# Patient Record
Sex: FEMALE | Race: OTHER | Employment: FULL TIME | URBAN - METROPOLITAN AREA
[De-identification: names, ages, dates, MRNs, and addresses within clinical notes are randomized per-mention and may not be internally consistent; named-entity substitution may affect disease eponyms.]

---

## 2017-08-31 ENCOUNTER — APPOINTMENT (OUTPATIENT)
Dept: OTHER | Facility: HOSPITAL | Age: 22
End: 2017-08-31
Attending: EMERGENCY MEDICINE

## 2018-02-07 ENCOUNTER — APPOINTMENT (OUTPATIENT)
Dept: OTHER | Facility: HOSPITAL | Age: 23
End: 2018-02-07
Attending: FAMILY MEDICINE

## 2018-02-14 ENCOUNTER — APPOINTMENT (OUTPATIENT)
Dept: OTHER | Facility: HOSPITAL | Age: 23
End: 2018-02-14
Attending: FAMILY MEDICINE

## 2018-02-16 ENCOUNTER — APPOINTMENT (OUTPATIENT)
Dept: OTHER | Facility: HOSPITAL | Age: 23
End: 2018-02-16
Attending: PREVENTIVE MEDICINE

## 2018-12-11 ENCOUNTER — APPOINTMENT (OUTPATIENT)
Dept: GENERAL RADIOLOGY | Facility: HOSPITAL | Age: 23
End: 2018-12-11
Attending: NURSE PRACTITIONER
Payer: COMMERCIAL

## 2018-12-11 ENCOUNTER — HOSPITAL ENCOUNTER (EMERGENCY)
Facility: HOSPITAL | Age: 23
Discharge: HOME | End: 2018-12-11
Payer: COMMERCIAL

## 2018-12-11 VITALS
OXYGEN SATURATION: 98 % | HEIGHT: 67 IN | RESPIRATION RATE: 18 BRPM | BODY MASS INDEX: 40.81 KG/M2 | TEMPERATURE: 99 F | DIASTOLIC BLOOD PRESSURE: 67 MMHG | SYSTOLIC BLOOD PRESSURE: 112 MMHG | WEIGHT: 260 LBS | HEART RATE: 87 BPM

## 2018-12-11 DIAGNOSIS — J06.9 VIRAL UPPER RESPIRATORY TRACT INFECTION: Primary | ICD-10-CM

## 2018-12-11 PROCEDURE — 87086 URINE CULTURE/COLONY COUNT: CPT | Performed by: NURSE PRACTITIONER

## 2018-12-11 PROCEDURE — 81001 URINALYSIS AUTO W/SCOPE: CPT | Performed by: NURSE PRACTITIONER

## 2018-12-11 PROCEDURE — 87430 STREP A AG IA: CPT

## 2018-12-11 PROCEDURE — 80048 BASIC METABOLIC PNL TOTAL CA: CPT | Performed by: NURSE PRACTITIONER

## 2018-12-11 PROCEDURE — 85025 COMPLETE CBC W/AUTO DIFF WBC: CPT | Performed by: NURSE PRACTITIONER

## 2018-12-11 PROCEDURE — 86308 HETEROPHILE ANTIBODY SCREEN: CPT | Performed by: NURSE PRACTITIONER

## 2018-12-11 PROCEDURE — 99284 EMERGENCY DEPT VISIT MOD MDM: CPT

## 2018-12-11 PROCEDURE — 36415 COLL VENOUS BLD VENIPUNCTURE: CPT

## 2018-12-11 PROCEDURE — 71046 X-RAY EXAM CHEST 2 VIEWS: CPT | Performed by: NURSE PRACTITIONER

## 2018-12-11 PROCEDURE — 81025 URINE PREGNANCY TEST: CPT

## 2018-12-11 NOTE — ED PROVIDER NOTES
Patient Seen in: [facility] AND [facility] Emergency Department    History   Patient presents with:  Cough/URI    Stated Complaint: dx with flu x 1 week/ not getting any better    HPI    Patient complains of cough and ear ache and sore throat that is not getting Date)   SpO2 98%   BMI 40.72 kg/m²      GENERAL: Well-appearing, no distress noted patient is speaking in full sentences swallowing her own secretions  HEAD: normocephalic, atraumatic,   EYES: PERRLA, EOMI, conj sclera clear  THROAT: mmm, no lesions, mild MDM           Radiology findings: Xr Chest Pa + Lat Chest (cpt=71046)    Result Date: 12/11/2018  CONCLUSION: Normal examination.       Dictated by (CST): Ani Staton MD on 12/11/2018 at 17:42     Approved by (CST): Ani Staton MD on 12/11/2

## 2018-12-11 NOTE — ED INITIAL ASSESSMENT (HPI)
Patient was seen at [facility] last week and told she had the flu. States she still has a cough and is feeling no better. Patient was given a z pack at [facility].

## 2019-01-29 ENCOUNTER — TELEPHONE (OUTPATIENT)
Dept: OBGYN CLINIC | Facility: CLINIC | Age: 24
End: 2019-01-29

## 2019-07-01 ENCOUNTER — OFFICE VISIT (OUTPATIENT)
Dept: FAMILY MEDICINE CLINIC | Facility: CLINIC | Age: 24
End: 2019-07-01
Payer: COMMERCIAL

## 2019-07-01 VITALS
RESPIRATION RATE: 16 BRPM | WEIGHT: 260 LBS | HEART RATE: 73 BPM | DIASTOLIC BLOOD PRESSURE: 61 MMHG | HEIGHT: 67 IN | BODY MASS INDEX: 40.81 KG/M2 | SYSTOLIC BLOOD PRESSURE: 104 MMHG | OXYGEN SATURATION: 100 % | TEMPERATURE: 98 F

## 2019-07-01 DIAGNOSIS — J00 ACUTE NASOPHARYNGITIS: Primary | ICD-10-CM

## 2019-07-01 DIAGNOSIS — J02.9 PHARYNGITIS, UNSPECIFIED ETIOLOGY: ICD-10-CM

## 2019-07-01 LAB
CONTROL LINE PRESENT WITH A CLEAR BACKGROUND (YES/NO): YES YES/NO
STREP GRP A CUL-SCR: NEGATIVE

## 2019-07-01 PROCEDURE — 99202 OFFICE O/P NEW SF 15 MIN: CPT | Performed by: NURSE PRACTITIONER

## 2019-07-01 PROCEDURE — 87880 STREP A ASSAY W/OPTIC: CPT | Performed by: NURSE PRACTITIONER

## 2019-07-01 NOTE — PATIENT INSTRUCTIONS
Adult Self-Care for Colds    Colds are caused by viruses. They can't be cured with antibiotics. However, you can ease symptoms and support your body's efforts to heal itself.  No matter which symptoms you have, be sure to:  · Drink plenty of fluids (water When you first notice symptoms, ask your healthcare provider if antiviral medicines are appropriate. Antibiotics should not be taken for colds or flu.  Also, call your healthcare provider if you have any of the following symptoms or if you aren't feeling be Over-the-counter medicine can reduce sore throat symptoms. Ask your pharmacist if you have questions about which medicine to use:  · Ease pain with anesthetic sprays. Aspirin or an aspirin substitute also helps.  Remember, never give aspirin to anyone 18 or

## 2019-07-01 NOTE — PROGRESS NOTES
CHIEF COMPLAINT:   Patient presents with:  Sore Throat: fever of 102F, coughing, congestion, rhinorreha. phelgm, X 1 day      HPI:   Spring Madeline is a 30year old female who presents for upper respiratory symptoms for  1 days.  Patient reports sor THROAT: Oral mucosa pink, moist. Posterior pharynx is mildly injected. no exudates. Tonsils 0/4. Uvula midline   NECK: Supple, non-tender  LUNGS: clear to auscultation bilaterally, no wheezes or rhonchi. Breathing is non labored.   CARDIO: RRR without murmu May consider OTC Cepacol as needed and directed on packaging for sore throat. RTC if symptoms worsening, persisting, or new symptoms develop. Patient Instructions       Adult Self-Care for Colds    Colds are caused by viruses.  They can't be cured w · As your appetite returns, you can resume your normal diet. Ask your healthcare provider if there are any foods you should avoid.   When to seek medical care  When you first notice symptoms, ask your healthcare provider if antiviral medicines are appropria Gargling every hour or 2 can ease irritation.  Try gargling with 1 of these solutions:  · 1/4 teaspoon of salt in 1/2 cup of warm water  · An over-the-counter anesthetic gargle  Use medicine for more relief  Over-the-counter medicine can reduce sore throat

## 2019-07-12 ENCOUNTER — APPOINTMENT (OUTPATIENT)
Dept: GENERAL RADIOLOGY | Facility: HOSPITAL | Age: 24
End: 2019-07-12
Payer: COMMERCIAL

## 2019-07-12 ENCOUNTER — HOSPITAL ENCOUNTER (EMERGENCY)
Facility: HOSPITAL | Age: 24
Discharge: HOME | End: 2019-07-12
Attending: EMERGENCY MEDICINE
Payer: COMMERCIAL

## 2019-07-12 VITALS
WEIGHT: 250 LBS | HEIGHT: 68 IN | RESPIRATION RATE: 18 BRPM | OXYGEN SATURATION: 98 % | HEART RATE: 83 BPM | TEMPERATURE: 98 F | DIASTOLIC BLOOD PRESSURE: 75 MMHG | BODY MASS INDEX: 37.89 KG/M2 | SYSTOLIC BLOOD PRESSURE: 120 MMHG

## 2019-07-12 DIAGNOSIS — J40 BRONCHITIS: Primary | ICD-10-CM

## 2019-07-12 PROCEDURE — 99283 EMERGENCY DEPT VISIT LOW MDM: CPT

## 2019-07-12 PROCEDURE — 71045 X-RAY EXAM CHEST 1 VIEW: CPT | Performed by: EMERGENCY MEDICINE

## 2019-07-12 RX ORDER — BENZONATATE 100 MG/1
100 CAPSULE ORAL 3 TIMES DAILY PRN
Qty: 30 CAPSULE | Refills: 0 | Status: SHIPPED | OUTPATIENT
Start: 2019-07-12 | End: 2019-08-11

## 2019-07-12 RX ORDER — ALBUTEROL SULFATE 90 UG/1
2 AEROSOL, METERED RESPIRATORY (INHALATION) EVERY 4 HOURS PRN
Qty: 1 INHALER | Refills: 0 | Status: SHIPPED | OUTPATIENT
Start: 2019-07-12 | End: 2019-08-11

## 2019-07-13 NOTE — ED PROVIDER NOTES
Patient Seen in: [facility] AND [facility] Emergency Department    History   Patient presents with:  Cough/URI    Stated Complaint:     HPI    31 yo female with cough for two weeks. Fever as recent as yesterday as high as 101. No relief with OTC cough meds.  Coug Discharge home on albuterol and tessalon.            Disposition and Plan     Clinical Impression:  Bronchitis  (primary encounter diagnosis)    Disposition:  Discharge  7/12/2019 11:06 pm    Follow-up:  José Miguel Rodriguez MD  [id number]

## 2019-07-14 ENCOUNTER — HOSPITAL ENCOUNTER (EMERGENCY)
Facility: HOSPITAL | Age: 24
Discharge: HOME | End: 2019-07-14
Payer: COMMERCIAL

## 2019-07-14 ENCOUNTER — APPOINTMENT (OUTPATIENT)
Dept: GENERAL RADIOLOGY | Facility: HOSPITAL | Age: 24
End: 2019-07-14
Payer: COMMERCIAL

## 2019-07-14 VITALS
SYSTOLIC BLOOD PRESSURE: 111 MMHG | HEART RATE: 80 BPM | DIASTOLIC BLOOD PRESSURE: 57 MMHG | OXYGEN SATURATION: 99 % | TEMPERATURE: 99 F | RESPIRATION RATE: 18 BRPM

## 2019-07-14 DIAGNOSIS — H65.02 NON-RECURRENT ACUTE SEROUS OTITIS MEDIA OF LEFT EAR: ICD-10-CM

## 2019-07-14 DIAGNOSIS — J01.10 ACUTE NON-RECURRENT FRONTAL SINUSITIS: Primary | ICD-10-CM

## 2019-07-14 PROCEDURE — 71046 X-RAY EXAM CHEST 2 VIEWS: CPT

## 2019-07-14 PROCEDURE — 99283 EMERGENCY DEPT VISIT LOW MDM: CPT

## 2019-07-14 RX ORDER — AMOXICILLIN AND CLAVULANATE POTASSIUM 875; 125 MG/1; MG/1
1 TABLET, FILM COATED ORAL 2 TIMES DAILY
Qty: 20 TABLET | Refills: 0 | Status: SHIPPED | OUTPATIENT
Start: 2019-07-14 | End: 2019-07-24

## 2019-07-14 RX ORDER — FLUTICASONE PROPIONATE 50 MCG
2 SPRAY, SUSPENSION (ML) NASAL DAILY
Qty: 16 G | Refills: 0 | Status: SHIPPED | OUTPATIENT
Start: 2019-07-14 | End: 2019-08-13

## 2019-07-14 RX ORDER — PSEUDOEPHEDRINE HCL 120 MG/1
120 TABLET, FILM COATED, EXTENDED RELEASE ORAL EVERY 12 HOURS PRN
Qty: 10 TABLET | Refills: 0 | Status: SHIPPED | OUTPATIENT
Start: 2019-07-14 | End: 2019-08-13

## 2019-07-14 NOTE — ED INITIAL ASSESSMENT (HPI)
Here Friday for bronchitis which pt reports is \"getting worser\" and sneezed with a big blood clot out of nose. No active bleeding from the nose.

## 2019-07-15 NOTE — ED PROVIDER NOTES
Patient Seen in: [facility] AND [facility] Emergency Department    History   Patient presents with:  Cough/URI    Stated Complaint: bloody nose    HPI    24-year-old female presents for complaint of cough congestion earache for the past 4 days.   Patient states t (37.1 °C)   Temp src Oral   SpO2 99 %   O2 Device None (Room air)       Current:/77   Pulse 91   Temp 98.7 °F (37.1 °C) (Oral)   Resp 18   LMP 07/05/2019   SpO2 99%         Physical Exam   Constitutional: She is oriented to person, place, and time.  Joanna Quarles worsening for 3 days. History of bronchitis. TECHNIQUE:   Two views. FINDINGS: CARDIAC/VASC: Normal.  No cardiac silhouette abnormality or cardiomegaly. Unremarkable pulmonary vasculature. MEDIAST/ENEDELIA: No visible mass or adenopathy.  LUNGS/PLEURA: Nor

## 2019-07-16 ENCOUNTER — OFFICE VISIT (OUTPATIENT)
Dept: FAMILY MEDICINE CLINIC | Facility: CLINIC | Age: 24
End: 2019-07-16
Payer: COMMERCIAL

## 2019-07-16 VITALS
HEART RATE: 76 BPM | SYSTOLIC BLOOD PRESSURE: 110 MMHG | HEIGHT: 67 IN | OXYGEN SATURATION: 98 % | DIASTOLIC BLOOD PRESSURE: 74 MMHG | WEIGHT: 280 LBS | BODY MASS INDEX: 43.95 KG/M2

## 2019-07-16 DIAGNOSIS — J00 ACUTE NASOPHARYNGITIS: ICD-10-CM

## 2019-07-16 DIAGNOSIS — J40 BRONCHITIS: Primary | ICD-10-CM

## 2019-07-16 DIAGNOSIS — J01.00 ACUTE NON-RECURRENT MAXILLARY SINUSITIS: ICD-10-CM

## 2019-07-16 PROCEDURE — 99203 OFFICE O/P NEW LOW 30 MIN: CPT | Performed by: FAMILY MEDICINE

## 2019-07-16 RX ORDER — PROMETHAZINE HYDROCHLORIDE AND CODEINE PHOSPHATE 6.25; 1 MG/5ML; MG/5ML
5 SOLUTION ORAL EVERY 6 HOURS PRN
Qty: 118 ML | Refills: 0 | Status: SHIPPED | OUTPATIENT
Start: 2019-07-16 | End: 2019-07-30

## 2019-07-16 NOTE — PROGRESS NOTES
HPI:   Patient presents with:  ER F/U      Micah Wilson is a 30year old female presenting for:      Went to ER 2d ago and dx w/ bronchitis and sinusitis.   Having cough, URI sx for past 2wks  Started on Abx (currently on day 3 of Abx, albuterol daily as needed for cough. Disp: 30 capsule Rfl: 0      No past medical history on file.       Past Surgical History:   Procedure Laterality Date   • TONSILLECTOMY       No Known Allergies   Social History:  Social History    Tobacco Use      Smoking status equal, round, and reactive to light. Conjunctivae are normal.   Cardiovascular: Normal rate, regular rhythm and normal heart sounds. No murmur heard. Pulmonary/Chest: Effort normal and breath sounds normal. No respiratory distress. Abdominal: Soft.  Yassine Arts

## 2019-12-27 ENCOUNTER — TELEPHONE (OUTPATIENT)
Dept: FAMILY MEDICINE CLINIC | Facility: CLINIC | Age: 24
End: 2019-12-27

## 2019-12-27 NOTE — TELEPHONE ENCOUNTER
Returned patient's call.  verified. Symptoms include cough, congestion and fever x 2 days. Notes everyone in her family has been sick. Patient reports taking Chiquita [place] Plus Cold & Flu, Robitussin DM and Tylenol for fever.   OTC meds are providing maite

## 2019-12-27 NOTE — TELEPHONE ENCOUNTER
Pt called stating that she has a bad bad cold since two days ago and not feeling good. She's been taking over the counter medication and its not helping.  She wanted to sched an appt for today but I informed her that  was completely full so she sched for

## 2019-12-30 ENCOUNTER — OFFICE VISIT (OUTPATIENT)
Dept: FAMILY MEDICINE CLINIC | Facility: CLINIC | Age: 24
End: 2019-12-30
Payer: COMMERCIAL

## 2019-12-30 VITALS
SYSTOLIC BLOOD PRESSURE: 122 MMHG | HEIGHT: 67 IN | DIASTOLIC BLOOD PRESSURE: 80 MMHG | TEMPERATURE: 98 F | WEIGHT: 288 LBS | BODY MASS INDEX: 45.2 KG/M2 | HEART RATE: 91 BPM | OXYGEN SATURATION: 96 %

## 2019-12-30 DIAGNOSIS — J02.9 SORE THROAT: ICD-10-CM

## 2019-12-30 DIAGNOSIS — J01.00 ACUTE NON-RECURRENT MAXILLARY SINUSITIS: Primary | ICD-10-CM

## 2019-12-30 DIAGNOSIS — J00 ACUTE NASOPHARYNGITIS: ICD-10-CM

## 2019-12-30 PROCEDURE — 87880 STREP A ASSAY W/OPTIC: CPT | Performed by: FAMILY MEDICINE

## 2019-12-30 PROCEDURE — 99213 OFFICE O/P EST LOW 20 MIN: CPT | Performed by: FAMILY MEDICINE

## 2019-12-30 RX ORDER — LORATADINE AND PSEUDOEPHEDRINE 10; 240 MG/1; MG/1
1 TABLET, EXTENDED RELEASE ORAL DAILY
Qty: 7 TABLET | Refills: 0 | Status: CANCELLED | OUTPATIENT
Start: 2019-12-30

## 2019-12-30 RX ORDER — FLUTICASONE PROPIONATE 50 MCG
2 SPRAY, SUSPENSION (ML) NASAL DAILY
Qty: 1 BOTTLE | Refills: 0 | Status: SHIPPED | OUTPATIENT
Start: 2019-12-30 | End: 2020-02-26

## 2019-12-30 RX ORDER — LORATADINE AND PSEUDOEPHEDRINE 10; 240 MG/1; MG/1
1 TABLET, EXTENDED RELEASE ORAL DAILY
Qty: 7 TABLET | Refills: 0 | Status: SHIPPED | OUTPATIENT
Start: 2019-12-30 | End: 2020-02-26

## 2019-12-30 RX ORDER — AZITHROMYCIN 250 MG/1
TABLET, FILM COATED ORAL
Qty: 6 TABLET | Refills: 0 | Status: SHIPPED | OUTPATIENT
Start: 2019-12-30 | End: 2020-02-26

## 2019-12-30 NOTE — PROGRESS NOTES
HPI:   Patient presents with:  URI: cough, sore throat.  congestion, bilateral ear pain and headache since Thursday      Nick Langhorne is a 30year old female presenting for:  URI/Cold   -Symptoms for 5d gradually worsening  -Patient reports coug Relation Age of Onset   • No Known Problems Father    • No Known Problems Mother    • Other (Muscular dystophy) Sister    • Diabetes Maternal Grandmother    • Hypertension Maternal Grandmother    • Other (brain cancer) Paternal Grandmother 76   • Other (ga Abdominal: Soft. Bowel sounds are normal. She exhibits no distension. There is no tenderness. Neurological: No cranial nerve deficit. Skin: No rash noted.            ASSESSMENT AND PLAN:   Patient is a 30year old female who presents primarily present Sig: Take 1 tablet by mouth daily.        Orders Placed This Encounter      POC Rapid Strep [id number]      Imaging & Consults:  None    Health Maintenance:  Annual Physical due on 03/18/1996  Annual Depression Screen due on 03/18/2005  HPV Vaccines(1 - Fe

## 2020-02-12 ENCOUNTER — HOSPITAL ENCOUNTER (EMERGENCY)
Facility: HOSPITAL | Age: 25
Discharge: HOME | End: 2020-02-12
Payer: COMMERCIAL

## 2020-02-12 ENCOUNTER — APPOINTMENT (OUTPATIENT)
Dept: ULTRASOUND IMAGING | Facility: HOSPITAL | Age: 25
End: 2020-02-12
Attending: NURSE PRACTITIONER
Payer: COMMERCIAL

## 2020-02-12 VITALS
SYSTOLIC BLOOD PRESSURE: 128 MMHG | BODY MASS INDEX: 38.51 KG/M2 | RESPIRATION RATE: 18 BRPM | HEART RATE: 74 BPM | OXYGEN SATURATION: 99 % | DIASTOLIC BLOOD PRESSURE: 77 MMHG | TEMPERATURE: 98 F | WEIGHT: 260 LBS | HEIGHT: 69 IN

## 2020-02-12 DIAGNOSIS — R10.13 EPIGASTRIC PAIN: Primary | ICD-10-CM

## 2020-02-12 LAB
ALBUMIN SERPL-MCNC: 3.8 G/DL (ref 3.4–5)
ALP LIVER SERPL-CCNC: 95 U/L (ref 37–98)
ALT SERPL-CCNC: 19 U/L (ref 13–56)
ANION GAP SERPL CALC-SCNC: 5 MMOL/L (ref 0–18)
AST SERPL-CCNC: 9 U/L (ref 15–37)
BASOPHILS # BLD AUTO: 0.02 X10(3) UL (ref 0–0.2)
BASOPHILS NFR BLD AUTO: 0.2 %
BILIRUB DIRECT SERPL-MCNC: <0.1 MG/DL (ref 0–0.2)
BILIRUB SERPL-MCNC: 0.2 MG/DL (ref 0.1–2)
BILIRUB UR QL: NEGATIVE
BUN BLD-MCNC: 10 MG/DL (ref 7–18)
BUN/CREAT SERPL: 19.2 (ref 10–20)
CALCIUM BLD-MCNC: 9.3 MG/DL (ref 8.5–10.1)
CHLORIDE SERPL-SCNC: 108 MMOL/L (ref 98–112)
CO2 SERPL-SCNC: 26 MMOL/L (ref 21–32)
CREAT BLD-MCNC: 0.52 MG/DL (ref 0.55–1.02)
DEPRECATED RDW RBC AUTO: 40.7 FL (ref 35.1–46.3)
EOSINOPHIL # BLD AUTO: 1.33 X10(3) UL (ref 0–0.7)
EOSINOPHIL NFR BLD AUTO: 15.6 %
ERYTHROCYTE [DISTWIDTH] IN BLOOD BY AUTOMATED COUNT: 13.8 % (ref 11–15)
GLUCOSE BLD-MCNC: 83 MG/DL (ref 70–99)
GLUCOSE UR-MCNC: NEGATIVE MG/DL
HCT VFR BLD AUTO: 36.9 % (ref 35–48)
HGB BLD-MCNC: 11.8 G/DL (ref 12–16)
IMM GRANULOCYTES # BLD AUTO: 0.01 X10(3) UL (ref 0–1)
IMM GRANULOCYTES NFR BLD: 0.1 %
KETONES UR-MCNC: NEGATIVE MG/DL
LIPASE SERPL-CCNC: 135 U/L (ref 73–393)
LYMPHOCYTES # BLD AUTO: 2.44 X10(3) UL (ref 1–4)
LYMPHOCYTES NFR BLD AUTO: 28.6 %
M PROTEIN MFR SERPL ELPH: 8 G/DL (ref 6.4–8.2)
MCH RBC QN AUTO: 26.2 PG (ref 26–34)
MCHC RBC AUTO-ENTMCNC: 32 G/DL (ref 31–37)
MCV RBC AUTO: 82 FL (ref 80–100)
MONOCYTES # BLD AUTO: 0.41 X10(3) UL (ref 0.1–1)
MONOCYTES NFR BLD AUTO: 4.8 %
NEUTROPHILS # BLD AUTO: 4.32 X10 (3) UL (ref 1.5–7.7)
NEUTROPHILS # BLD AUTO: 4.32 X10(3) UL (ref 1.5–7.7)
NEUTROPHILS NFR BLD AUTO: 50.7 %
NITRITE UR QL STRIP.AUTO: NEGATIVE
OSMOLALITY SERPL CALC.SUM OF ELEC: 286 MOSM/KG (ref 275–295)
PH UR: 8 [PH] (ref 5–8)
PLATELET # BLD AUTO: 279 10(3)UL (ref 150–450)
POTASSIUM SERPL-SCNC: 3.8 MMOL/L (ref 3.5–5.1)
PROT UR-MCNC: 100 MG/DL
RBC # BLD AUTO: 4.5 X10(6)UL (ref 3.8–5.3)
RBC #/AREA URNS AUTO: 4076 /HPF
SODIUM SERPL-SCNC: 139 MMOL/L (ref 136–145)
SP GR UR STRIP: 1.02 (ref 1–1.03)
UROBILINOGEN UR STRIP-ACNC: <2
WBC # BLD AUTO: 8.5 X10(3) UL (ref 4–11)
WBC #/AREA URNS AUTO: 15 /HPF

## 2020-02-12 PROCEDURE — 87086 URINE CULTURE/COLONY COUNT: CPT

## 2020-02-12 PROCEDURE — 36415 COLL VENOUS BLD VENIPUNCTURE: CPT

## 2020-02-12 PROCEDURE — 93005 ELECTROCARDIOGRAM TRACING: CPT

## 2020-02-12 PROCEDURE — 85025 COMPLETE CBC W/AUTO DIFF WBC: CPT

## 2020-02-12 PROCEDURE — 83690 ASSAY OF LIPASE: CPT

## 2020-02-12 PROCEDURE — 81001 URINALYSIS AUTO W/SCOPE: CPT

## 2020-02-12 PROCEDURE — 80048 BASIC METABOLIC PNL TOTAL CA: CPT

## 2020-02-12 PROCEDURE — 87077 CULTURE AEROBIC IDENTIFY: CPT

## 2020-02-12 PROCEDURE — 85060 BLOOD SMEAR INTERPRETATION: CPT

## 2020-02-12 PROCEDURE — 99284 EMERGENCY DEPT VISIT MOD MDM: CPT

## 2020-02-12 PROCEDURE — 76705 ECHO EXAM OF ABDOMEN: CPT | Performed by: NURSE PRACTITIONER

## 2020-02-12 PROCEDURE — 93010 ELECTROCARDIOGRAM REPORT: CPT | Performed by: INTERNAL MEDICINE

## 2020-02-12 PROCEDURE — 80076 HEPATIC FUNCTION PANEL: CPT

## 2020-02-12 RX ORDER — ONDANSETRON 4 MG/1
4 TABLET, ORALLY DISINTEGRATING ORAL ONCE
Status: COMPLETED | OUTPATIENT
Start: 2020-02-12 | End: 2020-02-12

## 2020-02-12 RX ORDER — OMEPRAZOLE 20 MG/1
20 CAPSULE, DELAYED RELEASE ORAL DAILY
Qty: 30 CAPSULE | Refills: 0 | Status: SHIPPED | OUTPATIENT
Start: 2020-02-12 | End: 2020-03-13

## 2020-02-12 NOTE — ED PROVIDER NOTES
Patient Seen in: [facility] Emergency Department      History   Patient presents with:  Epigastric Pain    Stated Complaint: epigastric abd pain    HPI    38-year-old female presents to the emergency department with epigastric pain intermittently neck supple. Cardiovascular:      Rate and Rhythm: Normal rate and regular rhythm. Heart sounds: No murmur. Pulmonary:      Effort: Pulmonary effort is normal.      Breath sounds: Normal breath sounds.    Abdominal:      Palpations: Abdomen is soft SCAN SLIDE   MD BLOOD SMEAR CONSULT   RAINBOW DRAW BLUE   RAINBOW DRAW LAVENDER   RAINBOW DRAW LIGHT GREEN   RAINBOW DRAW GOLD   URINE CULTURE, ROUTINE     EKG    Rate, intervals and axes as noted on EKG Report.   Rate: 79  Rhythm: Sinus Rhythm  Reading:

## 2020-02-14 ENCOUNTER — HOSPITAL ENCOUNTER (EMERGENCY)
Facility: HOSPITAL | Age: 25
Discharge: STILL ADMITTED OR ADMINISTRATIVE | End: 2020-02-14
Payer: COMMERCIAL

## 2020-02-17 ENCOUNTER — OFFICE VISIT (OUTPATIENT)
Dept: SURGERY | Facility: CLINIC | Age: 25
End: 2020-02-17
Payer: COMMERCIAL

## 2020-02-17 VITALS — TEMPERATURE: 99 F | HEIGHT: 69 IN | WEIGHT: 260 LBS | BODY MASS INDEX: 38.51 KG/M2

## 2020-02-17 DIAGNOSIS — R10.13 ACUTE EPIGASTRIC PAIN: Primary | ICD-10-CM

## 2020-02-17 PROCEDURE — 99203 OFFICE O/P NEW LOW 30 MIN: CPT | Performed by: SURGERY

## 2020-02-17 RX ORDER — SUCRALFATE 1 G/1
1 TABLET ORAL
Qty: 60 TABLET | Refills: 0 | Status: SHIPPED | OUTPATIENT
Start: 2020-02-17

## 2020-02-18 NOTE — H&P
History and Physical      HPI   Patient presents with:  Referral: Patient referred from ED and PCP, Dr. Jody Wasserman for epigastric pain. Onset about three weeks. Patient went to ED after about 24 hours of epigastric pain.   Patient also reports having diarrh History   Problem Relation Age of Onset   • No Known Problems Father    • No Known Problems Mother    • Other (Muscular dystophy) Sister    • Diabetes Maternal Grandmother    • Hypertension Maternal Grandmother    • Other (brain cancer) Paternal Grandmothe

## 2020-02-21 ENCOUNTER — TELEPHONE (OUTPATIENT)
Dept: SURGERY | Facility: CLINIC | Age: 25
End: 2020-02-21

## 2020-02-21 ENCOUNTER — HOSPITAL ENCOUNTER (OUTPATIENT)
Dept: NUCLEAR MEDICINE | Facility: HOSPITAL | Age: 25
Discharge: HOME | End: 2020-02-21
Attending: SURGERY
Payer: COMMERCIAL

## 2020-02-21 DIAGNOSIS — R10.13 ACUTE EPIGASTRIC PAIN: ICD-10-CM

## 2020-02-21 PROCEDURE — 78227 HEPATOBIL SYST IMAGE W/DRUG: CPT | Performed by: SURGERY

## 2020-02-21 NOTE — TELEPHONE ENCOUNTER
PC from pt. She had scan doen today at [facility] and wants to know results as soon as possible. Pt still having epigastric pain. Patient would like Dr. Mercedez Oconnell call her as soon as he gets results.       LOREN

## 2020-02-24 ENCOUNTER — TELEPHONE (OUTPATIENT)
Dept: FAMILY MEDICINE CLINIC | Facility: CLINIC | Age: 25
End: 2020-02-24

## 2020-02-24 ENCOUNTER — TELEPHONE (OUTPATIENT)
Dept: SURGERY | Facility: CLINIC | Age: 25
End: 2020-02-24

## 2020-02-24 DIAGNOSIS — R19.7 DIARRHEA, UNSPECIFIED TYPE: Primary | ICD-10-CM

## 2020-02-24 NOTE — TELEPHONE ENCOUNTER
Patient paged Dr Bhaskar Phillips is concerned states she is still having diarrhea and has contacted Dr Nirmala Chen office, I informed patient per notes Dr Nirmala Chen is aware and wants her to have stool test done and to follow up with him or his partners on Friday informed

## 2020-02-24 NOTE — TELEPHONE ENCOUNTER
To GI:      Patient had HIDA  scan and called for results. Dr. Sena Sandoval reviewed for Amber Anaya as he is out of the office this week. Dr. Sena Sandoval suggested patient have GI referral.      Patient c/o severe pain and diarrhea.   Patient advised to make a

## 2020-02-24 NOTE — TELEPHONE ENCOUNTER
Dr. Brian Loaiza-     for Dr. Caryn Daniel is out of the office    Patient is experiencing greater than 6 episode of diarrhea per day and 7/10 middle-upper abdominal pain. She is able to drink water but is feeling nauseous and has had a loss of appetite.  Her HIDA scan

## 2020-02-24 NOTE — TELEPHONE ENCOUNTER
Possibly some confusion here. This patient is known to Dr. Jason Mcmahan of General Surgery but appears to be completely new to our office. Reassuring ultrasound of gallbladder 2/12/2020 and HIDA scan 2/21/2020 reviewed.     Reassuring routine labs of 2/

## 2020-02-25 ENCOUNTER — APPOINTMENT (OUTPATIENT)
Dept: LAB | Facility: HOSPITAL | Age: 25
End: 2020-02-25
Attending: INTERNAL MEDICINE
Payer: COMMERCIAL

## 2020-02-25 PROCEDURE — 83993 ASSAY FOR CALPROTECTIN FECAL: CPT

## 2020-02-25 PROCEDURE — 87493 C DIFF AMPLIFIED PROBE: CPT

## 2020-02-25 NOTE — TELEPHONE ENCOUNTER
Spoke to patient on the phone she was agreeable to completing labs ordered by Dr. Jono Jameson today. She is now scheduled for [facility] tomorrow. And was instructed to go to urgent care if fever is 103 or greater.  No improvement or change in symptoms over ni

## 2020-02-25 NOTE — TELEPHONE ENCOUNTER
Thanks EMMANUEL Schulte see below. New patient to us. Stool tests ordered hopefully to be completed today.

## 2020-02-25 NOTE — H&P
[address] [facility] [address] Gastroenterology                                                                                                  Clinic History and Physical     Pa told her symptoms are likely related to gastritis and started on omeprazole daily. She denies a history of thyroid issues or food sensitivity/allergies.     Patient reports a prior history of fever (101) a couple of days ago but is currently afebrile wit Allergies:  No Known Allergies    ROS:   CONSTITUTIONAL:  negative for fevers, rigors  EYES:  negative for diplopia   RESPIRATORY:  negative for severe shortness of breath  CARDIOVASCULAR:  negative for crushing sub-sternal chest pain  GASTROINTESTIN exam as above. The patient was started on omeprazole 20 mg daily per ER and Carafate 1 g 4 times daily per general surgery in the last couple of weeks.   We discussed that Carafate would be best utilized dissolved in water to better coat the stomach/esopha returned to the lab I would also recommend celiac sprue and TSH. Consideration for EGD if celiac sprue is suspected. Patient is in agreement with the above work-up. All questions/concerns were addressed.         Recommend:  -Complete lab work  -Continue

## 2020-02-25 NOTE — TELEPHONE ENCOUNTER
Dr Gayle Amaya-    So sorry for the confusion. Patient will complete lab today. She has an appt with Antonina Grande tomorrow now. Thank you for advising.

## 2020-02-26 ENCOUNTER — APPOINTMENT (OUTPATIENT)
Dept: LAB | Facility: HOSPITAL | Age: 25
End: 2020-02-26
Attending: NURSE PRACTITIONER
Payer: COMMERCIAL

## 2020-02-26 ENCOUNTER — OFFICE VISIT (OUTPATIENT)
Dept: GASTROENTEROLOGY | Facility: CLINIC | Age: 25
End: 2020-02-26
Payer: COMMERCIAL

## 2020-02-26 VITALS
HEART RATE: 79 BPM | SYSTOLIC BLOOD PRESSURE: 103 MMHG | WEIGHT: 275 LBS | DIASTOLIC BLOOD PRESSURE: 65 MMHG | BODY MASS INDEX: 40.73 KG/M2 | HEIGHT: 69 IN

## 2020-02-26 DIAGNOSIS — R19.4 ALTERED BOWEL HABITS: ICD-10-CM

## 2020-02-26 DIAGNOSIS — R11.2 NAUSEA AND VOMITING, INTRACTABILITY OF VOMITING NOT SPECIFIED, UNSPECIFIED VOMITING TYPE: ICD-10-CM

## 2020-02-26 DIAGNOSIS — R10.13 EPIGASTRIC PAIN: Primary | ICD-10-CM

## 2020-02-26 DIAGNOSIS — R10.9 ABDOMINAL CRAMPING: ICD-10-CM

## 2020-02-26 DIAGNOSIS — R19.7 DIARRHEA, UNSPECIFIED TYPE: ICD-10-CM

## 2020-02-26 LAB
C DIFF TOX B STL QL: NEGATIVE
IGA SERPL-MCNC: 205 MG/DL (ref 70–312)
TSI SER-ACNC: 2.45 MIU/ML (ref 0.36–3.74)

## 2020-02-26 PROCEDURE — 83516 IMMUNOASSAY NONANTIBODY: CPT

## 2020-02-26 PROCEDURE — 99203 OFFICE O/P NEW LOW 30 MIN: CPT | Performed by: NURSE PRACTITIONER

## 2020-02-26 PROCEDURE — 36415 COLL VENOUS BLD VENIPUNCTURE: CPT

## 2020-02-26 PROCEDURE — 84443 ASSAY THYROID STIM HORMONE: CPT

## 2020-02-26 PROCEDURE — 82784 ASSAY IGA/IGD/IGG/IGM EACH: CPT | Performed by: NURSE PRACTITIONER

## 2020-02-26 NOTE — PATIENT INSTRUCTIONS
-Complete lab work  -Continue omeprazole 20 mg daily  -Continue Carafate (dissolved in water) 4 times daily as needed  -Follow-up in the next 6-8 weeks pending labs/symptoms

## 2020-02-27 LAB — CALPROTECTIN STL-MCNT: 30.6 ΜG/G (ref ?–50)

## 2020-02-28 LAB — TTG IGA SER-ACNC: 0.5 U/ML (ref ?–7)

## 2020-03-02 ENCOUNTER — PATIENT MESSAGE (OUTPATIENT)
Dept: GASTROENTEROLOGY | Facility: CLINIC | Age: 25
End: 2020-03-02

## 2020-03-03 NOTE — TELEPHONE ENCOUNTER
From: Monik Joshi  To: SHAUN Gresham  Sent: 3/2/2020 4:57 PM CST  Subject: Visit Follow-up Question    Teri Cardoza,   I received your response on the test results. I am pretty much feeling the same as how I felt when I seen you.   Is ther

## 2020-03-03 NOTE — TELEPHONE ENCOUNTER
I reviewed the patient's symptoms/lab findings over the phone. Her bowel frequency has actually improved over the last week from 10 to 5 BM/day though they continue to be diarrheal in nature.   No evidence of inflammation in the colon or an acute C. diffic

## 2020-03-03 NOTE — TELEPHONE ENCOUNTER
Anum Mejia-    Called pt to further triage. She states she feels exactly the same. Denies fever, black or bloody stools. Abdominal pain in same place \"middle under breasts\" before and after meals and in AM. Having 5 episodes of diarrhea per day.      Asking i

## 2020-03-17 ENCOUNTER — TELEPHONE (OUTPATIENT)
Dept: GASTROENTEROLOGY | Facility: CLINIC | Age: 25
End: 2020-03-17

## 2020-03-17 RX ORDER — NICOTINE POLACRILEX 4 MG/1
1 GUM, CHEWING ORAL DAILY
Qty: 30 TABLET | Refills: 3 | Status: SHIPPED | OUTPATIENT
Start: 2020-03-17

## 2020-03-17 NOTE — TELEPHONE ENCOUNTER
Patient contacted and message from [facility] given. Patient voiced understanding.

## 2020-03-17 NOTE — TELEPHONE ENCOUNTER
Please advise on refill request below. Thank you. LV 02/26/2020 with Malini RAJAN 02/12/2020 #30 per pharmacist  by Erlinda Akins    No future appointment noted in system      Per 02/26/2020 office visit note-  1.   Epigastric pain/marisela

## 2020-03-17 NOTE — TELEPHONE ENCOUNTER
Omeprazole 20 mg QD has been sent to pharmacy. Given the current concerns regarding COVID-19, I would not recommend the patient follow-up in office unless she has severe symptoms. We can follow-up at a later date/time.       Araceli Culver nursing

## 2020-04-02 RX ORDER — SUCRALFATE 1 G/1
1 TABLET ORAL
Qty: 60 TABLET | Refills: 0 | OUTPATIENT
Start: 2020-04-02

## 2020-04-18 ENCOUNTER — VIRTUAL PHONE E/M (OUTPATIENT)
Dept: INTERNAL MEDICINE CLINIC | Facility: CLINIC | Age: 25
End: 2020-04-18
Payer: COMMERCIAL

## 2020-04-18 DIAGNOSIS — H66.90 ACUTE OTITIS MEDIA, UNSPECIFIED OTITIS MEDIA TYPE: Primary | ICD-10-CM

## 2020-04-18 DIAGNOSIS — H69.83 EUSTACHIAN TUBE DYSFUNCTION, BILATERAL: ICD-10-CM

## 2020-04-18 DIAGNOSIS — J02.9 PHARYNGITIS, UNSPECIFIED ETIOLOGY: ICD-10-CM

## 2020-04-18 PROCEDURE — 99213 OFFICE O/P EST LOW 20 MIN: CPT | Performed by: FAMILY MEDICINE

## 2020-04-18 RX ORDER — AMOXICILLIN 875 MG/1
875 TABLET, COATED ORAL 2 TIMES DAILY
Qty: 20 TABLET | Refills: 0 | Status: SHIPPED | OUTPATIENT
Start: 2020-04-18 | End: 2020-06-22 | Stop reason: ALTCHOICE

## 2020-04-18 NOTE — PROGRESS NOTES
Virtual Telephone Check-In    Cadeleti Sanders verbally consents to a Virtual/Telephone Check-In visit on 04/18/20.     Patient understands and accepts financial responsibility for any deductible, co-insurance and/or co-pays associated with this servi

## 2020-06-16 NOTE — TELEPHONE ENCOUNTER
Nursing: Patient is overdue office follow-up.   May arrange by tele-visit this Thursday or later or in person OV per my staggered schedule

## 2020-06-17 NOTE — TELEPHONE ENCOUNTER
Nursing: Please see my below message.   I will discuss refills/continue medication use with the patient at the time of the appointment

## 2020-06-18 NOTE — TELEPHONE ENCOUNTER
Left message for patient to call back to schedule appointment with Sandie. CSS-if patient returns call please schedule for appointment with Irineo Lima.

## 2020-06-18 NOTE — PROGRESS NOTES
EULA Jazmyn Tele-visit    Parent/Caregiver verbally consents to a Virtual/Telephone Check-In service on 6/22/2020    Patient understands and accepts financial responsibility for any deductible, co-insurance and/or co-pays associated with this service.     This history of sleep apnea.     Pertinent Family Hx:  + Gastric CA, paternal grandfather (dx age 79)  - No family hx of esophageal or colon cancer  - No family history of IBD.     Prior endoscopies:  Denies     Social Hx:  - No smoking  + occasional etoh  - Mai Graves acute distress  Resp: non-labored breathing  Neuro: alert and interactive - oriented to person, time and place   Psych: calm, cooperative     ASSESSMENT AND PLAN:   Shelby Bunn is a 30year old year-old female pt of Dr. Robyn Ibanez Prescriptions      No prescriptions requested or ordered in this encounter       Imaging & Referrals:  None     Please note that this visit was completed using two-way, real-time interactive audio communication.   This has been done in good andrez to provide

## 2020-06-19 RX ORDER — OMEPRAZOLE 20 MG/1
CAPSULE, DELAYED RELEASE ORAL
Qty: 90 CAPSULE | Refills: 1 | OUTPATIENT
Start: 2020-06-19

## 2020-06-22 ENCOUNTER — VIRTUAL PHONE E/M (OUTPATIENT)
Dept: GASTROENTEROLOGY | Facility: CLINIC | Age: 25
End: 2020-06-22
Payer: COMMERCIAL

## 2020-06-22 DIAGNOSIS — R11.2 NAUSEA AND VOMITING, INTRACTABILITY OF VOMITING NOT SPECIFIED, UNSPECIFIED VOMITING TYPE: ICD-10-CM

## 2020-06-22 DIAGNOSIS — R19.4 ALTERED BOWEL HABITS: ICD-10-CM

## 2020-06-22 DIAGNOSIS — R10.13 EPIGASTRIC PAIN: Primary | ICD-10-CM

## 2020-06-22 DIAGNOSIS — R10.9 ABDOMINAL CRAMPING: ICD-10-CM

## 2020-06-22 PROCEDURE — 99214 OFFICE O/P EST MOD 30 MIN: CPT | Performed by: NURSE PRACTITIONER

## 2020-06-22 NOTE — PATIENT INSTRUCTIONS
-Continue omeprazole 20 mg daily  -Continue Carafate as needed  -Continue positive lifestyle/dietary changes  -Follow-up in late fall or sooner if new issues arise

## 2020-07-03 ENCOUNTER — TELEPHONE (OUTPATIENT)
Dept: GASTROENTEROLOGY | Facility: CLINIC | Age: 25
End: 2020-07-03

## 2020-07-03 NOTE — TELEPHONE ENCOUNTER
Pt states she is pooping reg stool but it is green  Like listerine green, pt is getting nervous about it . Please advise.

## 2020-07-03 NOTE — TELEPHONE ENCOUNTER
Spoke with patient,  C/o bright green stool. Denies diarrhea, constipation, bleeding, cramping, abdominal pain, nausea or vomiting. Stool is formed and of regular frequency. He diet consists of beef, chicken, rice. Does not eat many green vegetables.

## 2020-07-06 NOTE — TELEPHONE ENCOUNTER
LMTCB to discuss further w/ pt    FYI nursing: may forward to me when she calls back. If she has further updates or questions, please let me know. Thank you!

## 2020-07-13 NOTE — TELEPHONE ENCOUNTER
Kevin Lomeli    I spoke with the patient. Her stools normalized the day after calling us. She believes it was something that she ate. She is feeling well now and has no concerns. She will call the office if she has any issues in the future.

## 2020-08-05 ENCOUNTER — TELEPHONE (OUTPATIENT)
Dept: GASTROENTEROLOGY | Facility: CLINIC | Age: 25
End: 2020-08-05

## 2020-08-05 ENCOUNTER — PATIENT MESSAGE (OUTPATIENT)
Dept: GASTROENTEROLOGY | Facility: CLINIC | Age: 25
End: 2020-08-05

## 2020-08-05 NOTE — TELEPHONE ENCOUNTER
Pt states that she had sharp pain on her right side for about 10 minutes but it went away. It came back later but not as sharp. She still has a \"pinching\" feeling on her right side. It sometimes radiates to the back. See also patient message.   Please

## 2020-08-05 NOTE — TELEPHONE ENCOUNTER
I spoke with Amanda Smallwood, she had a sharp pain in her RUQ yesterday that is now tolerable but still present, it feels like a pinching. She has been taking omeprazole and carafate as prescribed and avoiding food trigger for her GERD, as well as salt.  Her BM have

## 2020-08-05 NOTE — TELEPHONE ENCOUNTER
From: Meri Noriega  To: SHAUN Langford  Sent: 8/5/2020 10:56 AM CDT  Subject: Shania Saleem,  I was wondering if you can give me a call.   I woke up with a really sharp pain on my right side that lasted about 10 minutes yesterday mor

## 2020-08-06 NOTE — PROGRESS NOTES
[facility] Follow-up Visit    [place] with dietary/lifestyle modifications. Denies nausea, vomiting, hematemesis, dysphagia odynophagia. No significant chest pain or shortness of breath. Appetite and weight are stable.     She describes her bowel movements as semi-formed but not overly vision  RESPIRATORY:  negative for  shortness of breath  CARDIOVASCULAR:  negative for  chest pain  GASTROINTESTINAL:  see HPI  GENITOURINARY:  negative for dysuria and hematuria   SKIN:  negative for  rash  ALLERGIC/IMMUNOLOGIC:  negative for hay fever al found to have more diffuse right-sided abdominal pain more prominent in the right lower quadrant. She questions the possibility of acute appendicitis. I would expect more severe/advancing symptoms over the last 2 weeks in the event of acute appendicitis. symptoms are persistent, could consider an increase in medication dose versus additional work-up.         Recommend:  -Complete CT scan  -ED evaluation if severe abdominal pain/fever develops  -Trial of dicyclomine/probiotics if CT imaging is negative  -Unl

## 2020-08-12 ENCOUNTER — OFFICE VISIT (OUTPATIENT)
Dept: GASTROENTEROLOGY | Facility: CLINIC | Age: 25
End: 2020-08-12
Payer: COMMERCIAL

## 2020-08-12 VITALS
HEART RATE: 84 BPM | WEIGHT: 285 LBS | BODY MASS INDEX: 42.21 KG/M2 | HEIGHT: 69 IN | SYSTOLIC BLOOD PRESSURE: 133 MMHG | DIASTOLIC BLOOD PRESSURE: 82 MMHG | TEMPERATURE: 97 F

## 2020-08-12 DIAGNOSIS — R10.9 RIGHT SIDED ABDOMINAL PAIN: Primary | ICD-10-CM

## 2020-08-12 DIAGNOSIS — R10.13 DYSPEPSIA: ICD-10-CM

## 2020-08-12 PROCEDURE — 99212 OFFICE O/P EST SF 10 MIN: CPT | Performed by: NURSE PRACTITIONER

## 2020-08-12 PROCEDURE — 99214 OFFICE O/P EST MOD 30 MIN: CPT | Performed by: NURSE PRACTITIONER

## 2020-08-12 PROCEDURE — 3008F BODY MASS INDEX DOCD: CPT | Performed by: NURSE PRACTITIONER

## 2020-08-12 PROCEDURE — 3075F SYST BP GE 130 - 139MM HG: CPT | Performed by: NURSE PRACTITIONER

## 2020-08-12 PROCEDURE — 3079F DIAST BP 80-89 MM HG: CPT | Performed by: NURSE PRACTITIONER

## 2020-08-12 NOTE — PATIENT INSTRUCTIONS
1.  Complete CT scan  2. If you develop severe abdominal pain or fever, go to the ER for more immediate evaluation  3. Continue omeprazole 20 mg daily  4.   Continue lifestyle/dietary modifications for gastritis/acid reflux

## 2020-08-18 ENCOUNTER — HOSPITAL ENCOUNTER (OUTPATIENT)
Dept: CT IMAGING | Facility: HOSPITAL | Age: 25
Discharge: HOME | End: 2020-08-18
Attending: NURSE PRACTITIONER
Payer: COMMERCIAL

## 2020-08-18 ENCOUNTER — PATIENT MESSAGE (OUTPATIENT)
Dept: GASTROENTEROLOGY | Facility: CLINIC | Age: 25
End: 2020-08-18

## 2020-08-18 DIAGNOSIS — R10.9 RIGHT SIDED ABDOMINAL PAIN: ICD-10-CM

## 2020-08-18 LAB — CREAT BLD-MCNC: 0.4 MG/DL (ref 0.55–1.02)

## 2020-08-18 PROCEDURE — 74177 CT ABD & PELVIS W/CONTRAST: CPT | Performed by: NURSE PRACTITIONER

## 2020-08-18 PROCEDURE — 82565 ASSAY OF CREATININE: CPT

## 2020-08-18 NOTE — TELEPHONE ENCOUNTER
Opal Ga, APRN  8/18/2020  4:09 PM      Reviewed CT scan w/ pt via [employer]. No acute findings c/w pt's R sided abd pain. + known hx fatty liver. Consider trial of probiotics/Bentyl - possible MSK etiology - consider d/w PCP.  Likely office f/u in the

## 2020-08-18 NOTE — TELEPHONE ENCOUNTER
From: Emily Rico  To: SHAUN Woody  Sent: 8/18/2020 4:21 PM CDT  Subject: Test Results Question    I have a question about CT Scan Abdomen Pelvis resulted on 8/18/20, 9:51 AM.      I would say to just go with the probiotics, I don't wan

## 2020-10-24 ENCOUNTER — HOSPITAL ENCOUNTER (OUTPATIENT)
Age: 25
Discharge: HOME | End: 2020-10-24
Attending: EMERGENCY MEDICINE
Payer: COMMERCIAL

## 2020-10-24 VITALS
WEIGHT: 280 LBS | HEART RATE: 84 BPM | BODY MASS INDEX: 41.47 KG/M2 | TEMPERATURE: 97 F | RESPIRATION RATE: 16 BRPM | SYSTOLIC BLOOD PRESSURE: 130 MMHG | HEIGHT: 69 IN | DIASTOLIC BLOOD PRESSURE: 74 MMHG | OXYGEN SATURATION: 100 %

## 2020-10-24 DIAGNOSIS — J02.0 STREP PHARYNGITIS: Primary | ICD-10-CM

## 2020-10-24 DIAGNOSIS — Z20.822 ENCOUNTER FOR LABORATORY TESTING FOR COVID-19 VIRUS: ICD-10-CM

## 2020-10-24 PROCEDURE — 99213 OFFICE O/P EST LOW 20 MIN: CPT | Performed by: EMERGENCY MEDICINE

## 2020-10-24 PROCEDURE — 87880 STREP A ASSAY W/OPTIC: CPT | Performed by: EMERGENCY MEDICINE

## 2020-10-24 RX ORDER — AMOXICILLIN 500 MG/1
500 TABLET, FILM COATED ORAL 3 TIMES DAILY
Qty: 30 TABLET | Refills: 0 | Status: SHIPPED | OUTPATIENT
Start: 2020-10-24 | End: 2020-10-24

## 2020-10-24 RX ORDER — AMOXICILLIN 500 MG/1
500 TABLET, FILM COATED ORAL 3 TIMES DAILY
Qty: 30 TABLET | Refills: 0 | Status: SHIPPED | OUTPATIENT
Start: 2020-10-24 | End: 2020-11-03

## 2020-10-24 NOTE — ED PROVIDER NOTES
Patient Seen in: [facility]      History   Patient presents with:  Sore Throat    Stated Complaint: pcp sent for covid and strep    HPI    Patient is a 57-year-old female who was sent in here by her primary care physician for a strep test as Tympanic membrane normal.      Nose: Nose normal.      Mouth/Throat:      Mouth: Mucous membranes are moist.   Eyes:      Extraocular Movements: Extraocular movements intact. Pupils: Pupils are equal, round, and reactive to light.    Cardiovascular:

## 2020-10-27 ENCOUNTER — TELEPHONE (OUTPATIENT)
Dept: FAMILY MEDICINE CLINIC | Facility: CLINIC | Age: 25
End: 2020-10-27

## 2020-10-27 NOTE — TELEPHONE ENCOUNTER
Patient is requesting a note for work. She went to urgent care over the weekend and got tested for covid and strep. covid results came back negative but strep came back positive. Patient is currently is on antibiotic for 10 days.      Please advice if n

## 2020-10-28 NOTE — TELEPHONE ENCOUNTER
Left detailed vm for patient that MD authorized note. Did inform her that this is the generic work note our [facility] provides patients for their employees. She is MyChart active and will receive letter through [address]

## 2020-11-08 ENCOUNTER — TELEPHONE (OUTPATIENT)
Dept: INTERNAL MEDICINE CLINIC | Facility: CLINIC | Age: 25
End: 2020-11-08

## 2020-11-08 DIAGNOSIS — R50.9 FEVER, UNSPECIFIED FEVER CAUSE: Primary | ICD-10-CM

## 2020-11-08 NOTE — TELEPHONE ENCOUNTER
Recently had strep . Completed antibiotic. Feels warm now 99.5. Her boss just tested positive for covid and they are close together. They spent a lot of time together. Last time she saw her boss was 6 days ago and boss felt sick at that time.    Cruz lynn

## 2020-11-09 ENCOUNTER — HOSPITAL ENCOUNTER (OUTPATIENT)
Age: 25
Discharge: HOME | End: 2020-11-09
Attending: FAMILY MEDICINE
Payer: COMMERCIAL

## 2020-11-09 VITALS
DIASTOLIC BLOOD PRESSURE: 60 MMHG | OXYGEN SATURATION: 96 % | SYSTOLIC BLOOD PRESSURE: 130 MMHG | RESPIRATION RATE: 17 BRPM | HEART RATE: 96 BPM | TEMPERATURE: 97 F

## 2020-11-09 DIAGNOSIS — Z20.822 ENCOUNTER FOR LABORATORY TESTING FOR COVID-19 VIRUS: Primary | ICD-10-CM

## 2020-11-09 DIAGNOSIS — H69.82 EUSTACHIAN TUBE DYSFUNCTION, LEFT: ICD-10-CM

## 2020-11-09 PROCEDURE — 99213 OFFICE O/P EST LOW 20 MIN: CPT | Performed by: FAMILY MEDICINE

## 2020-11-09 NOTE — ED INITIAL ASSESSMENT (HPI)
Spoke to pcp yesterday and order for covid test in record. Had covid test 10/24 negative but now has cough and boss has covid.  Dry non productive cough noted

## 2020-11-09 NOTE — ED PROVIDER NOTES
Patient Seen in: [facility]      History   Patient presents with:  Testing  Cough/URI    Stated Complaint: exposed/sorethroat/cough    HPI    Pt is a 31 yo with cold sx and positive exposure to covid. No fevers.  Wants a covid test    History Heart sounds: Normal heart sounds. Pulmonary:      Effort: Pulmonary effort is normal.      Breath sounds: Normal breath sounds. Skin:     General: Skin is warm. Capillary Refill: Capillary refill takes less than 2 seconds.    Neurological:

## 2020-11-13 ENCOUNTER — TELEPHONE (OUTPATIENT)
Dept: FAMILY MEDICINE CLINIC | Facility: CLINIC | Age: 25
End: 2020-11-13

## 2020-11-13 ENCOUNTER — TELEPHONE (OUTPATIENT)
Dept: INTERNAL MEDICINE CLINIC | Facility: CLINIC | Age: 25
End: 2020-11-13

## 2020-11-13 NOTE — TELEPHONE ENCOUNTER
Patient is calling stating her Covid test came back negative. She states she has a really bad dry cough. She Is taking robitussin but it is not helping. Please advice.

## 2020-11-13 NOTE — TELEPHONE ENCOUNTER
3 weeks ago diagnosed with strep, was better then sick again for last week mostly with cough. Lives with 2 people that are covid +, Was retested for covid on 11/9 was negative. Advised to continue to isolate as if she was positive for covid.  Still coughi

## 2021-02-06 ENCOUNTER — OFFICE VISIT (OUTPATIENT)
Dept: ALLERGY | Facility: CLINIC | Age: 26
End: 2021-02-06
Payer: COMMERCIAL

## 2021-02-06 ENCOUNTER — NURSE ONLY (OUTPATIENT)
Dept: ALLERGY | Facility: CLINIC | Age: 26
End: 2021-02-06
Payer: COMMERCIAL

## 2021-02-06 ENCOUNTER — LAB ENCOUNTER (OUTPATIENT)
Dept: LAB | Age: 26
End: 2021-02-06
Attending: ALLERGY & IMMUNOLOGY
Payer: COMMERCIAL

## 2021-02-06 VITALS
RESPIRATION RATE: 16 BRPM | HEIGHT: 69 IN | BODY MASS INDEX: 28.44 KG/M2 | WEIGHT: 192 LBS | SYSTOLIC BLOOD PRESSURE: 134 MMHG | OXYGEN SATURATION: 97 % | TEMPERATURE: 98 F | HEART RATE: 86 BPM | DIASTOLIC BLOOD PRESSURE: 88 MMHG

## 2021-02-06 DIAGNOSIS — Z91.018 FOOD ALLERGY: Primary | ICD-10-CM

## 2021-02-06 DIAGNOSIS — L25.4 CONTACT DERMATITIS DUE TO FOOD IN CONTACT WITH SKIN, UNSPECIFIED CONTACT DERMATITIS TYPE: Primary | ICD-10-CM

## 2021-02-06 DIAGNOSIS — R09.81 NASAL CONGESTION: ICD-10-CM

## 2021-02-06 DIAGNOSIS — Z91.018 FOOD ALLERGY: ICD-10-CM

## 2021-02-06 DIAGNOSIS — J30.89 PERENNIAL ALLERGIC RHINITIS: ICD-10-CM

## 2021-02-06 DIAGNOSIS — J30.89 ENVIRONMENTAL AND SEASONAL ALLERGIES: ICD-10-CM

## 2021-02-06 PROCEDURE — 86003 ALLG SPEC IGE CRUDE XTRC EA: CPT

## 2021-02-06 PROCEDURE — 99204 OFFICE O/P NEW MOD 45 MIN: CPT | Performed by: ALLERGY & IMMUNOLOGY

## 2021-02-06 PROCEDURE — 95024 IQ TESTS W/ALLERGENIC XTRCS: CPT | Performed by: ALLERGY & IMMUNOLOGY

## 2021-02-06 PROCEDURE — 3079F DIAST BP 80-89 MM HG: CPT | Performed by: ALLERGY & IMMUNOLOGY

## 2021-02-06 PROCEDURE — 3075F SYST BP GE 130 - 139MM HG: CPT | Performed by: ALLERGY & IMMUNOLOGY

## 2021-02-06 PROCEDURE — 3008F BODY MASS INDEX DOCD: CPT | Performed by: ALLERGY & IMMUNOLOGY

## 2021-02-06 PROCEDURE — 95004 PERQ TESTS W/ALRGNC XTRCS: CPT | Performed by: ALLERGY & IMMUNOLOGY

## 2021-02-06 RX ORDER — FLUTICASONE PROPIONATE 50 MCG
2 SPRAY, SUSPENSION (ML) NASAL DAILY
Qty: 1 BOTTLE | Refills: 0 | Status: SHIPPED | OUTPATIENT
Start: 2021-02-06 | End: 2021-03-01

## 2021-02-06 RX ORDER — LEVOCETIRIZINE DIHYDROCHLORIDE 5 MG/1
5 TABLET, FILM COATED ORAL NIGHTLY
Qty: 30 TABLET | Refills: 0 | Status: SHIPPED | OUTPATIENT
Start: 2021-02-06 | End: 2021-03-01

## 2021-02-06 NOTE — PROGRESS NOTES
Townsend Oiler is a 30year old female. HPI:   Patient presents with:  New Patient: Self-Referred. Pt's mother is a baker. Patient experienced a rash 10 mins after eating cheesecake.  She is also having some problems with gelatin, developed a r Sig Dispense Refill   • Fluticasone Propionate (FLONASE) 50 MCG/ACT Nasal Suspension 2 sprays by Nasal route daily. 1 Bottle 0   • Levocetirizine Dihydrochloride (XYZAL) 5 MG Oral Tab Take 1 tablet (5 mg total) by mouth nightly.  30 tablet 0   • Omeprazole noted  Respiratory: normal to inspection lungs are clear to auscultation bilaterally normal respiratory effort   Cardiovascular: regular rate and rhythm no murmurs, gallups, or rubs  Abdomen: soft non-tender non-distended  Skin/Hair: no unusual rashes pres decongestants  Start trial of Flonase 2 sprays per nostril once a day.   Advised to use on a daily basis rather than as needed may take up to 5 days to take full effect  May add pseudoephedrine 30 mg every 6 hours 120 mg every 12 hours if not improving with

## 2021-02-06 NOTE — PATIENT INSTRUCTIONS
1.  Contact dermatitis secondary to food  Localized rash on her hands and forearms after contact with baking gelatin. No vesicles or blisters. Rash resolved within less than 24 hours and improved with Benadryl. Potential contact urticaria.   No generaliz

## 2021-02-15 ENCOUNTER — TELEPHONE (OUTPATIENT)
Dept: ALLERGY | Facility: CLINIC | Age: 26
End: 2021-02-15

## 2021-02-15 NOTE — TELEPHONE ENCOUNTER
----- Message from Vahe Castelan MD sent at 2/15/2021  1:23 PM CST -----  Cathy Lewis, your allergy blood testing to gelatin has returned and was negative/normal.  This is good news.   Regards, Dr. Mellisa Dooley

## 2021-02-15 NOTE — TELEPHONE ENCOUNTER
Spoke with patient. Verified name and . Patient states she read test results via [employer] and states she does not have any questions at this time.

## 2021-03-01 RX ORDER — LEVOCETIRIZINE DIHYDROCHLORIDE 5 MG/1
TABLET, FILM COATED ORAL
Qty: 30 TABLET | Refills: 0 | Status: SHIPPED | OUTPATIENT
Start: 2021-03-01 | End: 2021-11-14

## 2021-03-01 RX ORDER — FLUTICASONE PROPIONATE 50 MCG
2 SPRAY, SUSPENSION (ML) NASAL DAILY
Qty: 1 BOTTLE | Refills: 0 | Status: SHIPPED | OUTPATIENT
Start: 2021-03-01 | End: 2021-10-10

## 2021-03-01 NOTE — TELEPHONE ENCOUNTER
Patient last seen in Allergy 2/6/2021 for .  . Yvonna Budge dermatitis due to food in contact with skin, unspecified contact dermatitis type  (primary encounter diagnosis)  Perennial allergic rhinitis  Nasal congestion  Food allergy    Refill request receiv

## 2021-03-01 NOTE — TELEPHONE ENCOUNTER
Message left on patient's voicemail that one month supply of Flonase and Xyzal was sent to [facility] pharmacy in [facility].     Patient informed they are 1 month refills only.       Patient informed on message that Dr. Yanely Akbar requested that patient make a f/u appt

## 2021-03-11 ENCOUNTER — OFFICE VISIT (OUTPATIENT)
Dept: ALLERGY | Facility: CLINIC | Age: 26
End: 2021-03-11
Payer: COMMERCIAL

## 2021-03-11 VITALS
HEART RATE: 77 BPM | RESPIRATION RATE: 20 BRPM | DIASTOLIC BLOOD PRESSURE: 59 MMHG | OXYGEN SATURATION: 97 % | TEMPERATURE: 98 F | SYSTOLIC BLOOD PRESSURE: 106 MMHG

## 2021-03-11 DIAGNOSIS — L25.4 CONTACT DERMATITIS DUE TO FOOD IN CONTACT WITH SKIN, UNSPECIFIED CONTACT DERMATITIS TYPE: Primary | ICD-10-CM

## 2021-03-11 DIAGNOSIS — J30.89 PERENNIAL ALLERGIC RHINITIS: ICD-10-CM

## 2021-03-11 DIAGNOSIS — J30.89 ENVIRONMENTAL AND SEASONAL ALLERGIES: ICD-10-CM

## 2021-03-11 PROCEDURE — 3078F DIAST BP <80 MM HG: CPT | Performed by: ALLERGY & IMMUNOLOGY

## 2021-03-11 PROCEDURE — 99213 OFFICE O/P EST LOW 20 MIN: CPT | Performed by: ALLERGY & IMMUNOLOGY

## 2021-03-11 PROCEDURE — 3074F SYST BP LT 130 MM HG: CPT | Performed by: ALLERGY & IMMUNOLOGY

## 2021-03-11 NOTE — PROGRESS NOTES
Shelby Bunn is a 30year old female. HPI:   Patient presents with:  Rash: Patient presents for 3 week follow-up for contact allergies. Allergies: Patient reports that she is taking Flonase and Xyzal as needed to control AR symptoms.       Pa route daily. 1 Bottle 0   • LEVOCETIRIZINE DIHYDROCHLORIDE 5 MG Oral Tab TAKE 1 TABLET BY MOUTH EVERY DAY AT NIGHT 30 tablet 0   • Omeprazole 20 MG Oral Tab EC Take 1 tablet by mouth daily.  30 tablet 3   • sucralfate 1 g Oral Tab Take 1 tablet (1 g total) gelatin. Triamcinolone 0.1% twice a day as needed if exposed        2.  AR   Continue with Flonase and Xyzal.    Reviewed avoidance measures and potential treatment option of immunotherapy      Follow-up in 1 year or sooner if needed         Orders This Vi

## 2021-03-11 NOTE — PATIENT INSTRUCTIONS
1. Contact derm  Secondary to gelatin. Serum IgE testing to gelatin was negative. Continue to avoid gelatin. Triamcinolone 0.1% twice a day as needed if exposed        2.  AR   Continue with Flonase and Xyzal.    Reviewed avoidance measures and potential

## 2021-10-11 RX ORDER — FLUTICASONE PROPIONATE 50 MCG
2 SPRAY, SUSPENSION (ML) NASAL DAILY
Qty: 3 EACH | Refills: 0 | Status: SHIPPED | OUTPATIENT
Start: 2021-10-11

## 2021-10-11 NOTE — TELEPHONE ENCOUNTER
Refill requested for Refills have been requested for the following medications:         Fluticasone Propionate 50 MCG/ACT Nasal Suspension Tl Del Castillo MD]     Preferred pharmacy: [facility] #[id number] - [facility]. [address]  AT [address] [place] [address]

## 2021-10-24 ENCOUNTER — IMMUNIZATION (OUTPATIENT)
Dept: LAB | Facility: HOSPITAL | Age: 26
End: 2021-10-24
Attending: EMERGENCY MEDICINE
Payer: COMMERCIAL

## 2021-10-24 DIAGNOSIS — Z23 NEED FOR VACCINATION: Primary | ICD-10-CM

## 2021-10-24 PROCEDURE — 0003A SARSCOV2 VAC 30MCG/0.3ML IM: CPT

## 2021-11-15 RX ORDER — LEVOCETIRIZINE DIHYDROCHLORIDE 5 MG/1
5 TABLET, FILM COATED ORAL NIGHTLY
Qty: 90 TABLET | Refills: 0 | Status: SHIPPED | OUTPATIENT
Start: 2021-11-15

## 2021-11-15 NOTE — TELEPHONE ENCOUNTER
Medication refilled x90 days. Patient will need yearly follow-up in March 2022.   Please call to schedule

## 2021-11-15 NOTE — TELEPHONE ENCOUNTER
Patient last seen in Allergy 3/11/2021 for .  . Yvonna Budge dermatitis due to food in contact with skin, unspecified contact dermatitis type  (primary encounter diagnosis)  Perennial allergic rhinitis  Environmental and seasonal allergies     Refill reques

## 2021-11-15 NOTE — TELEPHONE ENCOUNTER
Notified pt via [employer] of refill request and need for follow up in March 2022. Provided call back number to schedule.

## 2021-12-28 ENCOUNTER — HOSPITAL ENCOUNTER (OUTPATIENT)
Age: 26
Discharge: HOME | End: 2021-12-28
Payer: COMMERCIAL

## 2021-12-28 VITALS
RESPIRATION RATE: 16 BRPM | OXYGEN SATURATION: 100 % | HEIGHT: 68 IN | DIASTOLIC BLOOD PRESSURE: 69 MMHG | HEART RATE: 94 BPM | BODY MASS INDEX: 42.44 KG/M2 | TEMPERATURE: 97 F | WEIGHT: 280 LBS | SYSTOLIC BLOOD PRESSURE: 124 MMHG

## 2021-12-28 DIAGNOSIS — J02.0 STREPTOCOCCAL PHARYNGITIS: Primary | ICD-10-CM

## 2021-12-28 DIAGNOSIS — H66.002 NON-RECURRENT ACUTE SUPPURATIVE OTITIS MEDIA OF LEFT EAR WITHOUT SPONTANEOUS RUPTURE OF TYMPANIC MEMBRANE: ICD-10-CM

## 2021-12-28 DIAGNOSIS — M54.9 ACUTE BACK PAIN, UNSPECIFIED BACK LOCATION, UNSPECIFIED BACK PAIN LATERALITY: ICD-10-CM
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
## 2021-12-28 DIAGNOSIS — Z20.822 ENCOUNTER FOR LABORATORY TESTING FOR COVID-19 VIRUS: ICD-10-CM

## 2021-12-28 LAB — S PYO AG THROAT QL: POSITIVE

## 2021-12-28 PROCEDURE — 87880 STREP A ASSAY W/OPTIC: CPT | Performed by: NURSE PRACTITIONER

## 2021-12-28 PROCEDURE — 99214 OFFICE O/P EST MOD 30 MIN: CPT | Performed by: NURSE PRACTITIONER

## 2021-12-28 RX ORDER — CYCLOBENZAPRINE HCL 10 MG
10 TABLET ORAL 2 TIMES DAILY PRN
Qty: 10 TABLET | Refills: 0 | Status: SHIPPED | OUTPATIENT
Start: 2021-12-28 | End: 2021-12-28

## 2021-12-28 RX ORDER — AMOXICILLIN 500 MG/1
500 TABLET, FILM COATED ORAL 2 TIMES DAILY
Qty: 20 TABLET | Refills: 0 | Status: SHIPPED | OUTPATIENT
Start: 2021-12-28 | End: 2021-12-28

## 2021-12-28 RX ORDER — AMOXICILLIN 500 MG/1
500 TABLET, FILM COATED ORAL 2 TIMES DAILY
Qty: 20 TABLET | Refills: 0 | Status: SHIPPED | OUTPATIENT
Start: 2021-12-28 | End: 2022-01-07

## 2021-12-28 RX ORDER — CYCLOBENZAPRINE HCL 10 MG
10 TABLET ORAL 2 TIMES DAILY PRN
Qty: 10 TABLET | Refills: 0 | Status: SHIPPED | OUTPATIENT
Start: 2021-12-28
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
## 2021-12-30 LAB — SARS-COV-2 RNA RESP QL NAA+PROBE: NOT DETECTED

## 2022-01-05 ENCOUNTER — TELEPHONE (OUTPATIENT)
Dept: FAMILY MEDICINE CLINIC | Facility: CLINIC | Age: 27
End: 2022-01-05

## 2022-01-05 NOTE — TELEPHONE ENCOUNTER
Spoke to patient. Tentatively scheduled for a doxi appt with Dr. Peter Hammond on Monday 1/10 at 14:40 s/p completion of abx tx. Encouraged to hydrate, continue abx, and trial NSAID to help inflammation in ear. Pt verbalized understanding.

## 2022-01-05 NOTE — TELEPHONE ENCOUNTER
Pt is calling stating she went to [facility] on December 28, was tested for Covid negative and strep throat was positive. Was given amoxicillin, but she is still having ear pain. Wants to know if she is able to make an appointment.           Please call and ad

## 2022-01-10 ENCOUNTER — TELEMEDICINE (OUTPATIENT)
Dept: FAMILY MEDICINE CLINIC | Facility: CLINIC | Age: 27
End: 2022-01-10

## 2022-01-10 DIAGNOSIS — H92.09 EAR ACHE: Primary | ICD-10-CM

## 2022-01-10 PROCEDURE — 99213 OFFICE O/P EST LOW 20 MIN: CPT | Performed by: FAMILY MEDICINE

## 2022-01-10 RX ORDER — CIPROFLOXACIN AND DEXAMETHASONE 3; 1 MG/ML; MG/ML
4 SUSPENSION/ DROPS AURICULAR (OTIC) 2 TIMES DAILY
Qty: 1 EACH | Refills: 0 | Status: SHIPPED | OUTPATIENT
Start: 2022-01-10

## 2022-01-10 NOTE — PROGRESS NOTES
This is a telemedicine visit with live, interactive video and audio. Patient understands and accepts financial responsibility for any deductible, co-insurance and/or co-pays associated with this service.     SUBJECTIVE    Seen in [facility] 2 wks ago and diagno Omeprazole 20 MG Oral Tab EC Take 1 tablet by mouth daily. 30 tablet 3   • sucralfate 1 g Oral Tab Take 1 tablet (1 g total) by mouth 4 (four) times daily before meals and nightly.  60 tablet 0       OBJECTIVE  Physical Exam:   Appears AxOx3, no increased w

## 2022-04-27 ENCOUNTER — TELEPHONE (OUTPATIENT)
Dept: INTERNAL MEDICINE CLINIC | Facility: CLINIC | Age: 27
End: 2022-04-27

## 2022-07-11 RX ORDER — NICOTINE POLACRILEX 4 MG/1
1 GUM, CHEWING ORAL DAILY
Qty: 30 TABLET | Refills: 2 | Status: SHIPPED | OUTPATIENT
Start: 2022-07-11

## 2022-07-11 NOTE — TELEPHONE ENCOUNTER
Nursing: I have provided short term Rx however pt has not been seen since 2020. She will need to f/u in office for future refills.  As I will be soon on maternity leave, she may schedule w/ MD of her preference

## 2022-07-16 RX ORDER — FLUTICASONE PROPIONATE 50 MCG
2 SPRAY, SUSPENSION (ML) NASAL DAILY
Qty: 3 EACH | Refills: 0 | Status: SHIPPED | OUTPATIENT
Start: 2022-07-16

## 2022-07-22 NOTE — TELEPHONE ENCOUNTER
Left message to call back and schedule appointment. Call Center if patient returns call, please schedule Office visit with GI provider or Kellee Murphy. Thank you !

## 2022-07-26 ENCOUNTER — TELEPHONE (OUTPATIENT)
Dept: GASTROENTEROLOGY | Facility: CLINIC | Age: 27
End: 2022-07-26

## 2022-07-26 RX ORDER — PANTOPRAZOLE SODIUM 20 MG/1
20 TABLET, DELAYED RELEASE ORAL
Qty: 30 TABLET | Refills: 2 | Status: SHIPPED | OUTPATIENT
Start: 2022-07-26 | End: 2022-08-25

## 2022-07-26 NOTE — TELEPHONE ENCOUNTER
Per pharmacist, Omeprazole is not covered by patients insurance. Can either do a PA or change to Pantoprazole 40 mg. Please advise. Thank you.

## 2022-08-18 ENCOUNTER — HOSPITAL ENCOUNTER (OUTPATIENT)
Age: 27
Discharge: HOME | End: 2022-08-18
Payer: COMMERCIAL

## 2022-08-18 VITALS
RESPIRATION RATE: 20 BRPM | TEMPERATURE: 98 F | SYSTOLIC BLOOD PRESSURE: 109 MMHG | HEART RATE: 92 BPM | OXYGEN SATURATION: 97 % | DIASTOLIC BLOOD PRESSURE: 78 MMHG

## 2022-08-18 DIAGNOSIS — J02.9 VIRAL PHARYNGITIS: Primary | ICD-10-CM

## 2022-08-18 LAB — SARS-COV-2 RNA RESP QL NAA+PROBE: NOT DETECTED

## 2022-08-18 PROCEDURE — U0002 COVID-19 LAB TEST NON-CDC: HCPCS | Performed by: NURSE PRACTITIONER

## 2022-08-18 PROCEDURE — 99213 OFFICE O/P EST LOW 20 MIN: CPT | Performed by: NURSE PRACTITIONER

## 2022-08-27 ENCOUNTER — OFFICE VISIT (OUTPATIENT)
Dept: OBGYN CLINIC | Facility: CLINIC | Age: 27
End: 2022-08-27
Payer: COMMERCIAL

## 2022-08-27 ENCOUNTER — MED REC SCAN ONLY (OUTPATIENT)
Dept: OBGYN CLINIC | Facility: CLINIC | Age: 27
End: 2022-08-27

## 2022-08-27 VITALS
DIASTOLIC BLOOD PRESSURE: 79 MMHG | BODY MASS INDEX: 41.98 KG/M2 | HEIGHT: 68 IN | SYSTOLIC BLOOD PRESSURE: 119 MMHG | WEIGHT: 277 LBS

## 2022-08-27 DIAGNOSIS — Z01.419 ENCOUNTER FOR ANNUAL ROUTINE GYNECOLOGICAL EXAMINATION: Primary | ICD-10-CM

## 2022-08-27 PROCEDURE — 88175 CYTOPATH C/V AUTO FLUID REDO: CPT | Performed by: OBSTETRICS & GYNECOLOGY

## 2022-08-27 RX ORDER — OMEPRAZOLE 20 MG/1
1 TABLET, DELAYED RELEASE ORAL DAILY
COMMUNITY
Start: 2020-03-17

## 2022-09-02 ENCOUNTER — HOSPITAL ENCOUNTER (OUTPATIENT)
Age: 27
Discharge: HOME | End: 2022-09-02
Payer: COMMERCIAL

## 2022-09-02 VITALS
SYSTOLIC BLOOD PRESSURE: 128 MMHG | WEIGHT: 277 LBS | OXYGEN SATURATION: 100 % | TEMPERATURE: 98 F | HEIGHT: 68 IN | BODY MASS INDEX: 41.98 KG/M2 | RESPIRATION RATE: 20 BRPM | DIASTOLIC BLOOD PRESSURE: 79 MMHG | HEART RATE: 84 BPM

## 2022-09-02 DIAGNOSIS — Z20.822 ENCOUNTER FOR LABORATORY TESTING FOR COVID-19 VIRUS: Primary | ICD-10-CM

## 2022-09-02 DIAGNOSIS — U07.1 COVID-19: ICD-10-CM

## 2022-09-02 LAB — SARS-COV-2 RNA RESP QL NAA+PROBE: DETECTED

## 2022-09-11 ENCOUNTER — TELEPHONE (OUTPATIENT)
Dept: INTERNAL MEDICINE CLINIC | Facility: CLINIC | Age: 27
End: 2022-09-11

## 2022-09-12 ENCOUNTER — APPOINTMENT (OUTPATIENT)
Dept: GENERAL RADIOLOGY | Age: 27
End: 2022-09-12
Attending: NURSE PRACTITIONER
Payer: COMMERCIAL

## 2022-09-12 ENCOUNTER — HOSPITAL ENCOUNTER (OUTPATIENT)
Age: 27
Discharge: HOME | End: 2022-09-12
Payer: COMMERCIAL

## 2022-09-12 VITALS
HEART RATE: 98 BPM | OXYGEN SATURATION: 100 % | SYSTOLIC BLOOD PRESSURE: 125 MMHG | RESPIRATION RATE: 18 BRPM | DIASTOLIC BLOOD PRESSURE: 75 MMHG | TEMPERATURE: 98 F

## 2022-09-12 DIAGNOSIS — R05.1 ACUTE COUGH: ICD-10-CM

## 2022-09-12 DIAGNOSIS — H66.92 LEFT OTITIS MEDIA, UNSPECIFIED OTITIS MEDIA TYPE: Primary | ICD-10-CM

## 2022-09-12 PROCEDURE — 99213 OFFICE O/P EST LOW 20 MIN: CPT | Performed by: NURSE PRACTITIONER

## 2022-09-12 PROCEDURE — 71046 X-RAY EXAM CHEST 2 VIEWS: CPT | Performed by: NURSE PRACTITIONER

## 2022-09-12 RX ORDER — BENZONATATE 100 MG/1
200 CAPSULE ORAL 3 TIMES DAILY PRN
Qty: 30 CAPSULE | Refills: 0 | Status: SHIPPED | OUTPATIENT
Start: 2022-09-12 | End: 2022-10-12

## 2022-09-12 RX ORDER — ALBUTEROL SULFATE 90 UG/1
2 AEROSOL, METERED RESPIRATORY (INHALATION) EVERY 4 HOURS PRN
Qty: 1 EACH | Refills: 0 | Status: SHIPPED | OUTPATIENT
Start: 2022-09-12 | End: 2022-10-12

## 2022-09-12 RX ORDER — AMOXICILLIN 875 MG/1
875 TABLET, COATED ORAL 2 TIMES DAILY
Qty: 20 TABLET | Refills: 0 | Status: SHIPPED | OUTPATIENT
Start: 2022-09-12 | End: 2022-09-22

## 2022-09-16 RX ORDER — SUCRALFATE 1 G/1
1 TABLET ORAL
Qty: 60 TABLET | Refills: 0 | OUTPATIENT
Start: 2022-09-16

## 2023-01-22 ENCOUNTER — HOSPITAL ENCOUNTER (OUTPATIENT)
Age: 28
Discharge: HOME | End: 2023-01-22
Payer: COMMERCIAL

## 2023-01-22 VITALS
OXYGEN SATURATION: 97 % | SYSTOLIC BLOOD PRESSURE: 127 MMHG | RESPIRATION RATE: 16 BRPM | TEMPERATURE: 97 F | HEART RATE: 93 BPM | DIASTOLIC BLOOD PRESSURE: 76 MMHG

## 2023-01-22 DIAGNOSIS — H60.502 ACUTE OTITIS EXTERNA OF LEFT EAR, UNSPECIFIED TYPE: ICD-10-CM

## 2023-01-22 DIAGNOSIS — J06.9 VIRAL URI: ICD-10-CM

## 2023-01-22 DIAGNOSIS — J02.9 SORE THROAT: Primary | ICD-10-CM

## 2023-01-22 LAB
S PYO AG THROAT QL: NEGATIVE
SARS-COV-2 RNA RESP QL NAA+PROBE: NOT DETECTED

## 2023-01-22 PROCEDURE — 99213 OFFICE O/P EST LOW 20 MIN: CPT | Performed by: NURSE PRACTITIONER

## 2023-01-22 PROCEDURE — U0002 COVID-19 LAB TEST NON-CDC: HCPCS | Performed by: NURSE PRACTITIONER

## 2023-01-22 PROCEDURE — 87880 STREP A ASSAY W/OPTIC: CPT | Performed by: NURSE PRACTITIONER

## 2023-01-22 RX ORDER — CIPROFLOXACIN AND DEXAMETHASONE 3; 1 MG/ML; MG/ML
4 SUSPENSION/ DROPS AURICULAR (OTIC) 2 TIMES DAILY
Qty: 1 EACH | Refills: 0 | Status: SHIPPED | OUTPATIENT
Start: 2023-01-22 | End: 2023-01-29

## 2023-01-22 NOTE — DISCHARGE INSTRUCTIONS
COVID and strep negative  Ciprodex ear drops 4 drops to left ear twice per day for 7 days  Keep ears dry, use cotton ball in left ear when bathing  Push fluids  Tylenol/ibuprofen for throat pain  Warm fluids  Cepacol lozenges  Return for any new/worsening symptoms

## 2023-02-24 RX ORDER — LEVOCETIRIZINE DIHYDROCHLORIDE 5 MG/1
5 TABLET, FILM COATED ORAL NIGHTLY
Qty: 90 TABLET | Refills: 0 | OUTPATIENT
Start: 2023-02-24

## 2023-02-24 RX ORDER — FLUTICASONE PROPIONATE 50 MCG
2 SPRAY, SUSPENSION (ML) NASAL DAILY
Qty: 3 EACH | Refills: 0 | OUTPATIENT
Start: 2023-02-24

## 2023-05-10 ENCOUNTER — PATIENT MESSAGE (OUTPATIENT)
Dept: INTERNAL MEDICINE CLINIC | Facility: CLINIC | Age: 28
End: 2023-05-10

## 2023-05-10 DIAGNOSIS — Z00.00 HEALTHCARE MAINTENANCE: Primary | ICD-10-CM

## 2023-05-13 ENCOUNTER — LAB ENCOUNTER (OUTPATIENT)
Dept: LAB | Facility: REFERENCE LAB | Age: 28
End: 2023-05-13
Attending: FAMILY MEDICINE
Payer: COMMERCIAL

## 2023-05-13 LAB
ALBUMIN SERPL-MCNC: 3.5 G/DL (ref 3.4–5)
ALBUMIN/GLOB SERPL: 0.9 {RATIO} (ref 1–2)
ALP LIVER SERPL-CCNC: 73 U/L
ALT SERPL-CCNC: 19 U/L
ANION GAP SERPL CALC-SCNC: 5 MMOL/L (ref 0–18)
AST SERPL-CCNC: 12 U/L (ref 15–37)
BASOPHILS # BLD AUTO: 0.02 X10(3) UL (ref 0–0.2)
BASOPHILS NFR BLD AUTO: 0.3 %
BILIRUB SERPL-MCNC: 0.2 MG/DL (ref 0.1–2)
BUN BLD-MCNC: 11 MG/DL (ref 7–18)
BUN/CREAT SERPL: 22.4 (ref 10–20)
CALCIUM BLD-MCNC: 8.8 MG/DL (ref 8.5–10.1)
CHLORIDE SERPL-SCNC: 108 MMOL/L (ref 98–112)
CHOLEST SERPL-MCNC: 120 MG/DL (ref ?–200)
CO2 SERPL-SCNC: 25 MMOL/L (ref 21–32)
CREAT BLD-MCNC: 0.49 MG/DL
DEPRECATED RDW RBC AUTO: 39.6 FL (ref 35.1–46.3)
EOSINOPHIL # BLD AUTO: 0.11 X10(3) UL (ref 0–0.7)
EOSINOPHIL NFR BLD AUTO: 1.4 %
ERYTHROCYTE [DISTWIDTH] IN BLOOD BY AUTOMATED COUNT: 13.6 % (ref 11–15)
EST. AVERAGE GLUCOSE BLD GHB EST-MCNC: 114 MG/DL (ref 68–126)
FASTING PATIENT LIPID ANSWER: YES
FASTING STATUS PATIENT QL REPORTED: YES
GFR SERPLBLD BASED ON 1.73 SQ M-ARVRAT: 130 ML/MIN/1.73M2 (ref 60–?)
GLOBULIN PLAS-MCNC: 3.8 G/DL (ref 2.8–4.4)
GLUCOSE BLD-MCNC: 88 MG/DL (ref 70–99)
HBA1C MFR BLD: 5.6 % (ref ?–5.7)
HCT VFR BLD AUTO: 35.2 %
HDLC SERPL-MCNC: 43 MG/DL (ref 40–59)
HGB BLD-MCNC: 10.9 G/DL
IMM GRANULOCYTES # BLD AUTO: 0.02 X10(3) UL (ref 0–1)
IMM GRANULOCYTES NFR BLD: 0.3 %
LDLC SERPL CALC-MCNC: 60 MG/DL (ref ?–100)
LYMPHOCYTES # BLD AUTO: 2.56 X10(3) UL (ref 1–4)
LYMPHOCYTES NFR BLD AUTO: 32.5 %
MCH RBC QN AUTO: 24.9 PG (ref 26–34)
MCHC RBC AUTO-ENTMCNC: 31 G/DL (ref 31–37)
MCV RBC AUTO: 80.5 FL
MONOCYTES # BLD AUTO: 0.37 X10(3) UL (ref 0.1–1)
MONOCYTES NFR BLD AUTO: 4.7 %
NEUTROPHILS # BLD AUTO: 4.8 X10 (3) UL (ref 1.5–7.7)
NEUTROPHILS # BLD AUTO: 4.8 X10(3) UL (ref 1.5–7.7)
NEUTROPHILS NFR BLD AUTO: 60.8 %
NONHDLC SERPL-MCNC: 77 MG/DL (ref ?–130)
OSMOLALITY SERPL CALC.SUM OF ELEC: 285 MOSM/KG (ref 275–295)
PLATELET # BLD AUTO: 281 10(3)UL (ref 150–450)
POTASSIUM SERPL-SCNC: 4.2 MMOL/L (ref 3.5–5.1)
PROT SERPL-MCNC: 7.3 G/DL (ref 6.4–8.2)
RBC # BLD AUTO: 4.37 X10(6)UL
SODIUM SERPL-SCNC: 138 MMOL/L (ref 136–145)
TRIGL SERPL-MCNC: 84 MG/DL (ref 30–149)
TSI SER-ACNC: 2.08 MIU/ML (ref 0.36–3.74)
VLDLC SERPL CALC-MCNC: 12 MG/DL (ref 0–30)
WBC # BLD AUTO: 7.9 X10(3) UL (ref 4–11)

## 2023-05-13 PROCEDURE — 83036 HEMOGLOBIN GLYCOSYLATED A1C: CPT | Performed by: FAMILY MEDICINE

## 2023-05-13 PROCEDURE — 80050 GENERAL HEALTH PANEL: CPT | Performed by: FAMILY MEDICINE

## 2023-05-13 PROCEDURE — 80061 LIPID PANEL: CPT | Performed by: FAMILY MEDICINE

## 2023-05-15 ENCOUNTER — TELEPHONE (OUTPATIENT)
Dept: OBGYN CLINIC | Facility: CLINIC | Age: 28
End: 2023-05-15

## 2023-05-20 ENCOUNTER — HOSPITAL ENCOUNTER (OUTPATIENT)
Dept: NUTRITION | Facility: HOSPITAL | Age: 28
Discharge: HOME | End: 2023-05-20
Attending: FAMILY MEDICINE
Payer: COMMERCIAL

## 2023-05-20 DIAGNOSIS — Z71.3 WEIGHT LOSS COUNSELING, ENCOUNTER FOR: ICD-10-CM

## 2023-05-20 PROCEDURE — 97802 MEDICAL NUTRITION INDIV IN: CPT | Performed by: DIETITIAN, REGISTERED

## 2023-06-05 ENCOUNTER — TELEPHONE (OUTPATIENT)
Dept: OBGYN CLINIC | Facility: CLINIC | Age: 28
End: 2023-06-05

## 2023-07-05 ENCOUNTER — HOSPITAL ENCOUNTER (OUTPATIENT)
Age: 28
Discharge: HOME | End: 2023-07-05
Payer: COMMERCIAL

## 2023-07-05 VITALS
HEART RATE: 96 BPM | RESPIRATION RATE: 18 BRPM | TEMPERATURE: 97 F | SYSTOLIC BLOOD PRESSURE: 113 MMHG | DIASTOLIC BLOOD PRESSURE: 73 MMHG | OXYGEN SATURATION: 100 %

## 2023-07-05 DIAGNOSIS — J02.9 VIRAL PHARYNGITIS: Primary | ICD-10-CM

## 2023-07-05 LAB
S PYO AG THROAT QL: NEGATIVE
SARS-COV-2 RNA RESP QL NAA+PROBE: NOT DETECTED

## 2023-07-05 PROCEDURE — 87880 STREP A ASSAY W/OPTIC: CPT | Performed by: NURSE PRACTITIONER

## 2023-07-05 PROCEDURE — U0002 COVID-19 LAB TEST NON-CDC: HCPCS | Performed by: NURSE PRACTITIONER

## 2023-07-05 PROCEDURE — 99213 OFFICE O/P EST LOW 20 MIN: CPT | Performed by: NURSE PRACTITIONER

## 2023-07-05 NOTE — DISCHARGE INSTRUCTIONS
Push fluids. Salt water gargles. Tylenol or Motrin as needed for pain or fever. Follow-up as needed with your doctor. Return for any concerns.

## 2023-07-05 NOTE — ED INITIAL ASSESSMENT (HPI)
Pt in [address] [place] [address] for c/o sore throat, painful swallowing, stuffy nose and HA x monday

## 2023-08-29 ENCOUNTER — OFFICE VISIT (OUTPATIENT)
Dept: OBGYN CLINIC | Facility: CLINIC | Age: 28
End: 2023-08-29
Payer: COMMERCIAL

## 2023-08-29 VITALS
SYSTOLIC BLOOD PRESSURE: 126 MMHG | DIASTOLIC BLOOD PRESSURE: 74 MMHG | HEIGHT: 68 IN | WEIGHT: 293 LBS | BODY MASS INDEX: 44.41 KG/M2

## 2023-08-29 DIAGNOSIS — N92.0 MENORRHAGIA WITH REGULAR CYCLE: ICD-10-CM

## 2023-08-29 DIAGNOSIS — Z01.419 ENCOUNTER FOR ANNUAL ROUTINE GYNECOLOGICAL EXAMINATION: Primary | ICD-10-CM

## 2023-08-29 PROCEDURE — 3008F BODY MASS INDEX DOCD: CPT | Performed by: OBSTETRICS & GYNECOLOGY

## 2023-08-29 PROCEDURE — 3074F SYST BP LT 130 MM HG: CPT | Performed by: OBSTETRICS & GYNECOLOGY

## 2023-08-29 PROCEDURE — 99395 PREV VISIT EST AGE 18-39: CPT | Performed by: OBSTETRICS & GYNECOLOGY

## 2023-08-29 PROCEDURE — 3078F DIAST BP <80 MM HG: CPT | Performed by: OBSTETRICS & GYNECOLOGY

## 2023-08-29 RX ORDER — TRANEXAMIC ACID 650 MG/1
1300 TABLET ORAL 3 TIMES DAILY
Qty: 90 TABLET | Refills: 3 | Status: SHIPPED | OUTPATIENT
Start: 2023-08-29

## 2023-09-25 ENCOUNTER — OFFICE VISIT (OUTPATIENT)
Dept: FAMILY MEDICINE CLINIC | Facility: CLINIC | Age: 28
End: 2023-09-25

## 2023-09-25 VITALS
SYSTOLIC BLOOD PRESSURE: 105 MMHG | HEIGHT: 68 IN | BODY MASS INDEX: 44.41 KG/M2 | DIASTOLIC BLOOD PRESSURE: 70 MMHG | OXYGEN SATURATION: 99 % | HEART RATE: 87 BPM | WEIGHT: 293 LBS

## 2023-09-25 DIAGNOSIS — Z00.00 WELL ADULT EXAM: Primary | ICD-10-CM

## 2023-09-25 DIAGNOSIS — E66.01 MORBID OBESITY WITH BMI OF 45.0-49.9, ADULT (HCC): ICD-10-CM

## 2023-09-25 DIAGNOSIS — N92.0 MENORRHAGIA WITH REGULAR CYCLE: ICD-10-CM

## 2023-09-25 DIAGNOSIS — D64.9 MILD ANEMIA: ICD-10-CM

## 2023-09-25 PROCEDURE — 3008F BODY MASS INDEX DOCD: CPT | Performed by: FAMILY MEDICINE

## 2023-09-25 PROCEDURE — 99385 PREV VISIT NEW AGE 18-39: CPT | Performed by: FAMILY MEDICINE

## 2023-09-25 PROCEDURE — 90715 TDAP VACCINE 7 YRS/> IM: CPT | Performed by: FAMILY MEDICINE

## 2023-09-25 PROCEDURE — 3078F DIAST BP <80 MM HG: CPT | Performed by: FAMILY MEDICINE

## 2023-09-25 PROCEDURE — 90471 IMMUNIZATION ADMIN: CPT | Performed by: FAMILY MEDICINE

## 2023-09-25 PROCEDURE — 3074F SYST BP LT 130 MM HG: CPT | Performed by: FAMILY MEDICINE

## 2023-10-03 ENCOUNTER — PATIENT MESSAGE (OUTPATIENT)
Dept: FAMILY MEDICINE CLINIC | Facility: CLINIC | Age: 28
End: 2023-10-03

## 2023-10-03 DIAGNOSIS — Z82.0: Primary | ICD-10-CM

## 2023-10-04 NOTE — TELEPHONE ENCOUNTER
Patient requesting for genetic testing for muscle dystrophy. Last OV 9/25. Dr. Bebeto Agosto please advise.

## 2023-11-10 ENCOUNTER — APPOINTMENT (OUTPATIENT)
Dept: GENETICS | Facility: HOSPITAL | Age: 28
End: 2023-11-10
Attending: GENETIC COUNSELOR, MS
Payer: COMMERCIAL

## 2023-11-10 ENCOUNTER — APPOINTMENT (OUTPATIENT)
Dept: HEMATOLOGY/ONCOLOGY | Facility: HOSPITAL | Age: 28
End: 2023-11-10
Attending: GENETIC COUNSELOR, MS
Payer: COMMERCIAL

## 2024-05-20 ENCOUNTER — TELEMEDICINE (OUTPATIENT)
Dept: FAMILY MEDICINE CLINIC | Facility: CLINIC | Age: 29
End: 2024-05-20

## 2024-05-20 DIAGNOSIS — E61.1 IRON DEFICIENCY: ICD-10-CM

## 2024-05-20 DIAGNOSIS — E66.01 MORBID OBESITY WITH BMI OF 45.0-49.9, ADULT (HCC): Primary | ICD-10-CM

## 2024-05-20 DIAGNOSIS — N92.0 MENORRHAGIA WITH REGULAR CYCLE: ICD-10-CM

## 2024-05-20 NOTE — PROGRESS NOTES
This visit is conducted using Telemedicine with live, interactive video and audio during this Coronavirus pandemic.    Please note that the following visit was completed using two-way, real-time interactive audio and/or video communication.  This has been done in good andrez to provide continuity of care in the best interest of the provider-patient relationship, due to the ongoing public health crisis/national emergency and because of restrictions of visitation.  There are limitations of this visit as no physical exam could be performed.  Every conscious effort was taken to allow for sufficient and adequate time.  This billing was spent on reviewing labs, medications, radiology tests and decision making.  Appropriate medical decision-making and tests are ordered as detailed in the plan of care below    WILMER LOW or legal guardian   verbally consents to a Virtual/Telephone/ VideoCheck-In service on 5/20/2024  Patient understands and accepts financial responsibility for any deductible, co-insurance and/or co-pays associated with this service.    Duration of the service: 9 mins 9 secs      HPI:    Wilmer Low is a 29 year old female.      Chief Complaint   Patient presents with    Weight Gain     Patient struggling with weight  She is trying to conceive  She keeps gaining weight    She saw a nutritionist  Working out 3-4 x week    Weight management appointment not till July 9 2024.  Looked into wegovy.  She states she called her insurance and was told that this medication is covered.  She would like prescription for this.    Has not tried any other weight loss medications in the past.  Denies any history or family history of thyroid cancer.  No history of pancreatitis    She is overdue for annual labs         ROS  A comprehensive 10 point review of systems was completed.  Pertinent positives and negatives noted in the the HPI.      PATIENTS DENIES ANY KNOWN EXPOSURE TO ANYONE CONFIRMED FOR COVID  19.      Patient Active Problem List   Diagnosis    Morbid obesity with BMI of 45.0-49.9, adult ([facility])    Mild anemia    Menorrhagia with regular cycle    Iron deficiency       Past Medical History:    Abnormal uterine bleeding    I went to ER for heavy blood which was not related to period    Dysmenorrhea    always have really bad cramps.    Gastritis    Ovarian cyst    per pt h/x            MEDICATION LIST    Current Outpatient Medications:     tranexamic acid 650 MG Oral Tab, Take 2 tablets (1,300 mg total) by mouth in the morning, at noon, and at bedtime., Disp: 90 tablet, Rfl: 3    Omeprazole Magnesium 20 MG Oral Tab EC, Take 1 tablet (20 mg total) by mouth daily., Disp: 90 tablet, Rfl: 0    fluticasone propionate 50 MCG/ACT Nasal Suspension, 2 sprays by Nasal route daily., Disp: 3 each, Rfl: 0    levocetirizine 5 MG Oral Tab, Take 1 tablet (5 mg total) by mouth nightly., Disp: 90 tablet, Rfl: 0    triamcinolone acetonide 0.1 % External Ointment, Applied 2 times per day as needed, Disp: 60 g, Rfl: 0    ALLERGY LIST  No Known Allergies    Allergies:No Known Allergies    PHYSICAL EXAM:     Vitals signs taken at home if available:    LMP 09/07/2023 (Exact Date)       Limited examination for this telephone/ video visit due to the coronavirus emergency      General Appearance:  alert, well developed, in no acute distress  Not in acute distress, comfortably seated in own residence  Patient was speaking in complete sentences, no increased work of breathing and very coherent and alert on the phone.  Throat/Neck: normal sound to voice.   Respiratory:  non-labored. no increased work of breathing.    Skin:  normal moisture and skin texture, normal color of skin, no jaundice  Hematologic:  no excessive bruising  Psychiatric:  oriented to time, self, and place  Patient was responding to questions appropriately.        ASSESSMENT/PLAN:     Encounter Diagnoses   Name Primary?    Morbid obesity with BMI of 45.0-49.9, adult  (HCC) Yes    Iron deficiency     Menorrhagia with regular cycle        1. Morbid obesity with BMI of 45.0-49.9, adult (HCC)    - Insulin [E]; Future  - Vitamin B12; Future  - Vitamin D [E]; Future    - CBC With Differential With Platelet; Future  - Comp Metabolic Panel (14); Future  - Lipid Panel; Future  - TSH W Reflex To Free T4; Future  - Hemoglobin A1C; Future    3. Iron deficiency    - Iron And Tibc; Future    4. Menorrhagia with regular cycle  Patient states she is seeing gynecology      Follow up after labs        Patient reminded to practice good health and safety measures including washing hands, social distancing, covering mouth when coughing/ sneezing, avoid social meetings/ gatherings in face of this Covid 19 pandemic.    Patient verbalized understanding of plan and all questions answered to the best of my ability.    Patient to call back if any change/ worsening of symptoms.      Orders Placed This Encounter   Procedures    CBC With Differential With Platelet    Comp Metabolic Panel (14)    Lipid Panel    TSH W Reflex To Free T4    Hemoglobin A1C    Insulin [E]    Vitamin B12    Vitamin D [E]    Iron And Tibc       Meds This Visit:  Requested Prescriptions      No prescriptions requested or ordered in this encounter       Imaging & Referrals:  None        Nic Cordova MD    5/20/2024  2:25 PM

## 2024-05-25 ENCOUNTER — LAB ENCOUNTER (OUTPATIENT)
Dept: LAB | Facility: HOSPITAL | Age: 29
End: 2024-05-25
Attending: FAMILY MEDICINE

## 2024-05-25 DIAGNOSIS — E66.01 MORBID OBESITY WITH BMI OF 45.0-49.9, ADULT (HCC): ICD-10-CM

## 2024-05-25 DIAGNOSIS — E61.1 IRON DEFICIENCY: ICD-10-CM

## 2024-05-25 LAB
ALBUMIN SERPL-MCNC: 4.4 G/DL (ref 3.2–4.8)
ALBUMIN/GLOB SERPL: 1.5 {RATIO} (ref 1–2)
ALP LIVER SERPL-CCNC: 78 U/L
ALT SERPL-CCNC: 19 U/L
ANION GAP SERPL CALC-SCNC: 8 MMOL/L (ref 0–18)
AST SERPL-CCNC: 18 U/L (ref ?–34)
BASOPHILS # BLD AUTO: 0.02 X10(3) UL (ref 0–0.2)
BASOPHILS NFR BLD AUTO: 0.3 %
BILIRUB SERPL-MCNC: 0.4 MG/DL (ref 0.3–1.2)
BUN BLD-MCNC: 11 MG/DL (ref 9–23)
BUN/CREAT SERPL: 20 (ref 10–20)
CALCIUM BLD-MCNC: 9.4 MG/DL (ref 8.7–10.4)
CHLORIDE SERPL-SCNC: 110 MMOL/L (ref 98–112)
CHOLEST SERPL-MCNC: 127 MG/DL (ref ?–200)
CO2 SERPL-SCNC: 24 MMOL/L (ref 21–32)
CREAT BLD-MCNC: 0.55 MG/DL
DEPRECATED RDW RBC AUTO: 39.6 FL (ref 35.1–46.3)
EGFRCR SERPLBLD CKD-EPI 2021: 126 ML/MIN/1.73M2 (ref 60–?)
EOSINOPHIL # BLD AUTO: 0.12 X10(3) UL (ref 0–0.7)
EOSINOPHIL NFR BLD AUTO: 1.5 %
ERYTHROCYTE [DISTWIDTH] IN BLOOD BY AUTOMATED COUNT: 13.6 % (ref 11–15)
EST. AVERAGE GLUCOSE BLD GHB EST-MCNC: 120 MG/DL (ref 68–126)
FASTING PATIENT LIPID ANSWER: YES
FASTING STATUS PATIENT QL REPORTED: YES
GLOBULIN PLAS-MCNC: 3 G/DL (ref 2–3.5)
GLUCOSE BLD-MCNC: 89 MG/DL (ref 70–99)
HBA1C MFR BLD: 5.8 % (ref ?–5.7)
HCT VFR BLD AUTO: 34 %
HDLC SERPL-MCNC: 35 MG/DL (ref 40–59)
HGB BLD-MCNC: 11.4 G/DL
IMM GRANULOCYTES # BLD AUTO: 0.02 X10(3) UL (ref 0–1)
IMM GRANULOCYTES NFR BLD: 0.3 %
INSULIN SERPL-ACNC: 13.9 MU/L (ref 3–25)
IRON SATN MFR SERPL: 16 %
IRON SERPL-MCNC: 55 UG/DL
LDLC SERPL CALC-MCNC: 77 MG/DL (ref ?–100)
LYMPHOCYTES # BLD AUTO: 2.41 X10(3) UL (ref 1–4)
LYMPHOCYTES NFR BLD AUTO: 31.1 %
MCH RBC QN AUTO: 27 PG (ref 26–34)
MCHC RBC AUTO-ENTMCNC: 33.5 G/DL (ref 31–37)
MCV RBC AUTO: 80.4 FL
MONOCYTES # BLD AUTO: 0.35 X10(3) UL (ref 0.1–1)
MONOCYTES NFR BLD AUTO: 4.5 %
NEUTROPHILS # BLD AUTO: 4.84 X10 (3) UL (ref 1.5–7.7)
NEUTROPHILS # BLD AUTO: 4.84 X10(3) UL (ref 1.5–7.7)
NEUTROPHILS NFR BLD AUTO: 62.3 %
NONHDLC SERPL-MCNC: 92 MG/DL (ref ?–130)
OSMOLALITY SERPL CALC.SUM OF ELEC: 293 MOSM/KG (ref 275–295)
PLATELET # BLD AUTO: 274 10(3)UL (ref 150–450)
POTASSIUM SERPL-SCNC: 4 MMOL/L (ref 3.5–5.1)
PROT SERPL-MCNC: 7.4 G/DL (ref 5.7–8.2)
RBC # BLD AUTO: 4.23 X10(6)UL
SODIUM SERPL-SCNC: 142 MMOL/L (ref 136–145)
TIBC SERPL-MCNC: 352 UG/DL (ref 250–425)
TRANSFERRIN SERPL-MCNC: 236 MG/DL (ref 250–380)
TRIGL SERPL-MCNC: 75 MG/DL (ref 30–149)
TSI SER-ACNC: 2.29 MIU/ML (ref 0.55–4.78)
VIT B12 SERPL-MCNC: 472 PG/ML (ref 211–911)
VIT D+METAB SERPL-MCNC: 26.4 NG/ML (ref 30–100)
VLDLC SERPL CALC-MCNC: 12 MG/DL (ref 0–30)
WBC # BLD AUTO: 7.8 X10(3) UL (ref 4–11)

## 2024-05-25 PROCEDURE — 84466 ASSAY OF TRANSFERRIN: CPT

## 2024-05-25 PROCEDURE — 85025 COMPLETE CBC W/AUTO DIFF WBC: CPT

## 2024-05-25 PROCEDURE — 82607 VITAMIN B-12: CPT

## 2024-05-25 PROCEDURE — 80053 COMPREHEN METABOLIC PANEL: CPT

## 2024-05-25 PROCEDURE — 80061 LIPID PANEL: CPT

## 2024-05-25 PROCEDURE — 83036 HEMOGLOBIN GLYCOSYLATED A1C: CPT

## 2024-05-25 PROCEDURE — 84443 ASSAY THYROID STIM HORMONE: CPT

## 2024-05-25 PROCEDURE — 36415 COLL VENOUS BLD VENIPUNCTURE: CPT

## 2024-05-25 PROCEDURE — 82306 VITAMIN D 25 HYDROXY: CPT

## 2024-05-25 PROCEDURE — 83540 ASSAY OF IRON: CPT

## 2024-05-25 PROCEDURE — 83525 ASSAY OF INSULIN: CPT

## 2024-07-06 ENCOUNTER — OFFICE VISIT (OUTPATIENT)
Dept: FAMILY MEDICINE CLINIC | Facility: CLINIC | Age: 29
End: 2024-07-06

## 2024-07-06 ENCOUNTER — LAB ENCOUNTER (OUTPATIENT)
Dept: LAB | Age: 29
End: 2024-07-06
Attending: FAMILY MEDICINE
Payer: COMMERCIAL

## 2024-07-06 VITALS
SYSTOLIC BLOOD PRESSURE: 110 MMHG | BODY MASS INDEX: 44.41 KG/M2 | HEIGHT: 68 IN | HEART RATE: 80 BPM | DIASTOLIC BLOOD PRESSURE: 78 MMHG | WEIGHT: 293 LBS

## 2024-07-06 DIAGNOSIS — E61.1 IRON DEFICIENCY: ICD-10-CM

## 2024-07-06 DIAGNOSIS — E55.9 VITAMIN D DEFICIENCY: ICD-10-CM

## 2024-07-06 DIAGNOSIS — R73.03 PREDIABETES: ICD-10-CM

## 2024-07-06 DIAGNOSIS — Z00.00 WELL ADULT EXAM: Primary | ICD-10-CM

## 2024-07-06 DIAGNOSIS — H65.02 ACUTE SEROUS OTITIS MEDIA OF LEFT EAR, RECURRENCE NOT SPECIFIED: ICD-10-CM

## 2024-07-06 LAB
BASOPHILS # BLD AUTO: 0.03 X10(3) UL (ref 0–0.2)
BASOPHILS NFR BLD AUTO: 0.4 %
DEPRECATED RDW RBC AUTO: 40.9 FL (ref 35.1–46.3)
EOSINOPHIL # BLD AUTO: 0.12 X10(3) UL (ref 0–0.7)
EOSINOPHIL NFR BLD AUTO: 1.6 %
ERYTHROCYTE [DISTWIDTH] IN BLOOD BY AUTOMATED COUNT: 14 % (ref 11–15)
HCT VFR BLD AUTO: 37.6 %
HGB BLD-MCNC: 12.3 G/DL
IMM GRANULOCYTES # BLD AUTO: 0.02 X10(3) UL (ref 0–1)
IMM GRANULOCYTES NFR BLD: 0.3 %
IRON SATN MFR SERPL: 15 %
IRON SERPL-MCNC: 52 UG/DL
LYMPHOCYTES # BLD AUTO: 2.1 X10(3) UL (ref 1–4)
LYMPHOCYTES NFR BLD AUTO: 27.8 %
MCH RBC QN AUTO: 26.4 PG (ref 26–34)
MCHC RBC AUTO-ENTMCNC: 32.7 G/DL (ref 31–37)
MCV RBC AUTO: 80.7 FL
MONOCYTES # BLD AUTO: 0.35 X10(3) UL (ref 0.1–1)
MONOCYTES NFR BLD AUTO: 4.6 %
NEUTROPHILS # BLD AUTO: 4.94 X10 (3) UL (ref 1.5–7.7)
NEUTROPHILS # BLD AUTO: 4.94 X10(3) UL (ref 1.5–7.7)
NEUTROPHILS NFR BLD AUTO: 65.3 %
PLATELET # BLD AUTO: 257 10(3)UL (ref 150–450)
RBC # BLD AUTO: 4.66 X10(6)UL
TIBC SERPL-MCNC: 356 UG/DL (ref 250–425)
TRANSFERRIN SERPL-MCNC: 239 MG/DL (ref 250–380)
WBC # BLD AUTO: 7.6 X10(3) UL (ref 4–11)

## 2024-07-06 PROCEDURE — 83540 ASSAY OF IRON: CPT

## 2024-07-06 PROCEDURE — 36415 COLL VENOUS BLD VENIPUNCTURE: CPT

## 2024-07-06 PROCEDURE — 85025 COMPLETE CBC W/AUTO DIFF WBC: CPT

## 2024-07-06 PROCEDURE — 84466 ASSAY OF TRANSFERRIN: CPT

## 2024-07-06 RX ORDER — CHOLECALCIFEROL (VITAMIN D3) 50 MCG
TABLET ORAL
COMMUNITY

## 2024-07-06 RX ORDER — AMOXICILLIN 875 MG/1
875 TABLET, COATED ORAL 2 TIMES DAILY
Qty: 20 TABLET | Refills: 0 | Status: SHIPPED | OUTPATIENT
Start: 2024-07-06 | End: 2024-07-16

## 2024-07-06 RX ORDER — FOLIC ACID 1 MG/1
TABLET ORAL DAILY
COMMUNITY

## 2024-07-06 RX ORDER — RIBOFLAVIN (VITAMIN B2) 100 MG
100 TABLET ORAL DAILY
COMMUNITY

## 2024-07-06 NOTE — PROGRESS NOTES
HPI:    Patient ID: Dominique Low is a 29 year old female.    HPI  Chief Complaint   Patient presents with    Routine Physical       Wt Readings from Last 6 Encounters:   07/06/24 (!) 307 lb 12.8 oz (139.6 kg)   09/25/23 300 lb (136.1 kg)   08/29/23 (!) 303 lb (137.4 kg)   09/02/22 277 lb (125.6 kg)   08/27/22 277 lb (125.6 kg)   12/28/21 280 lb (127 kg)     BP Readings from Last 3 Encounters:   07/06/24 110/78   09/25/23 105/70   08/29/23 126/74     Needs a physical  for insurance.  No complaints    Taking iron daily, tolerating wel  Menses have improved  when she takes tranexamic acid    No kids.    Non smoker  No drugs    Working -  customer service    Weight management appointment next week.      Review of Systems   Constitutional:  Negative for activity change, appetite change, chills, fatigue, fever and unexpected weight change.   HENT:  Negative for congestion, dental problem, drooling, ear discharge, ear pain, facial swelling, hearing loss, mouth sores, nosebleeds, postnasal drip, rhinorrhea, sinus pressure, sinus pain, sneezing, sore throat, tinnitus, trouble swallowing and voice change.    Eyes:  Negative for pain, discharge, redness and visual disturbance.   Respiratory:  Negative for cough, shortness of breath and wheezing.    Cardiovascular:  Negative for chest pain, palpitations and leg swelling.   Gastrointestinal:  Negative for abdominal pain, anal bleeding, blood in stool, constipation, diarrhea, nausea, rectal pain and vomiting.   Endocrine: Negative for cold intolerance, heat intolerance, polydipsia, polyphagia and polyuria.   Genitourinary:  Negative for decreased urine volume, difficulty urinating, dysuria, flank pain, frequency, menstrual problem, pelvic pain, urgency, vaginal bleeding, vaginal discharge and vaginal pain.        Periods are regular     Musculoskeletal:  Negative for arthralgias, back pain and myalgias.   Skin:  Negative for rash.   Neurological:  Negative for  dizziness, seizures, syncope, weakness, numbness and headaches.   Hematological:  Does not bruise/bleed easily.   Psychiatric/Behavioral:  Negative for behavioral problems, decreased concentration, self-injury, sleep disturbance and suicidal ideas. The patient is not nervous/anxious.        /78   Pulse 80   Ht 5' 8\" (1.727 m)   Wt (!) 307 lb 12.8 oz (139.6 kg)   LMP 06/09/2024 (Exact Date)   BMI 46.80 kg/m²     Past Medical History:    Abnormal uterine bleeding    I went to ER for heavy blood which was not related to period    Allergic rhinitis    Anemia    Mild    Dysmenorrhea    always have really bad cramps.    Esophageal reflux    Gastritis    Ovarian cyst    per pt h/x      Past Surgical History:   Procedure Laterality Date    Tonsillectomy       Social History     Socioeconomic History    Marital status:      Spouse name: Not on file    Number of children: Not on file    Years of education: Not on file    Highest education level: Not on file   Occupational History    Occupation:     Tobacco Use    Smoking status: Never    Smokeless tobacco: Never    Tobacco comments:     N/A   Vaping Use    Vaping status: Never Used   Substance and Sexual Activity    Alcohol use: Yes     Comment: occasionally    Drug use: Never    Sexual activity: Yes     Partners: Male   Other Topics Concern    Caffeine Concern No    Exercise No    Seat Belt No    Special Diet No    Stress Concern No    Weight Concern Yes     Comment: I am currently over weight, need to loose some weight     Service Not Asked    Blood Transfusions No    Occupational Exposure Not Asked    Hobby Hazards Not Asked    Sleep Concern Not Asked    Back Care Not Asked    Bike Helmet Not Asked    Self-Exams Not Asked   Social History Narrative    Not on file     Social Determinants of Health     Financial Resource Strain: Not on file   Food Insecurity: Not on file   Transportation Needs: Not on file   Physical Activity: Not on file    Stress: Not on file   Social Connections: Not on file   Housing Stability: Not on file     Family History   Problem Relation Age of Onset    No Known Problems Father     No Known Problems Mother     Other (Muscular dystophy) Sister     Diabetes Maternal Grandmother     Hypertension Maternal Grandmother     Cancer Paternal Grandmother         cancerous brain tumor    Other (brain cancer) Paternal Grandmother 68    Cancer Paternal Grandfather         Stomach cancer    Other (gastric cancer) Paternal Grandfather 60    Breast Cancer Neg     Ovarian Cancer Neg     Uterine Cancer Neg        Immunization History   Administered Date(s) Administered    Covid-19 Vaccine Pfizer 30 mcg/0.3 ml 03/16/2021, 04/05/2021, 10/24/2021    DTAP INFANRIX 04/02/1997, 03/04/1998    DTP 06/04/1997, 08/06/1997    FLUZONE 6 months and older PFS 0.5 ml (16352) 09/08/2019, 08/30/2020, 09/10/2021, 08/19/2023    Flucelvax 0.5ml Vaccine 01/28/2018    HEP B, Ped/Adol 04/02/1997, 06/04/1997    Influenza 07/02/2019, 08/29/2020, 09/03/2021, 09/30/2022    Influenza(Afluria)0.5ml QIV PFS 09/08/2019    TDAP 09/25/2023       Health Maintenance   Topic Date Due    COVID-19 Vaccine (4 - 2023-24 season) 09/01/2023    Annual Physical  09/25/2024    Influenza Vaccine (1) 10/01/2024    Pap Smear  08/27/2025    DTaP,Tdap,and Td Vaccines (6 - Td or Tdap) 09/25/2033    Annual Depression Screening  Completed    Pneumococcal Vaccine: Birth to 64yrs  Aged Out          Current Outpatient Medications   Medication Sig Dispense Refill    Cholecalciferol (VITAMIN D) 50 MCG (2000 UT) Oral Tab Take by mouth.      Ascorbic Acid (VITAMIN C) 100 MG Oral Tab Take 1 tablet (100 mg total) by mouth daily.      folic acid 1 MG Oral Tab Take by mouth daily.      amoxicillin 875 MG Oral Tab Take 1 tablet (875 mg total) by mouth 2 (two) times daily for 10 days. 20 tablet 0    Ferrous Gluconate 324 (37.5 Fe) MG Oral Tab Take 1 tablet (324 mg total) by mouth daily. 90 tablet 3     tranexamic acid 650 MG Oral Tab Take 2 tablets (1,300 mg total) by mouth in the morning, at noon, and at bedtime. 90 tablet 3    Omeprazole Magnesium 20 MG Oral Tab EC Take 1 tablet (20 mg total) by mouth daily. 90 tablet 0    fluticasone propionate 50 MCG/ACT Nasal Suspension 2 sprays by Nasal route daily. 3 each 0    levocetirizine 5 MG Oral Tab Take 1 tablet (5 mg total) by mouth nightly. 90 tablet 0    triamcinolone acetonide 0.1 % External Ointment Applied 2 times per day as needed 60 g 0     Allergies:No Known Allergies   PHYSICAL EXAM:     Chief Complaint   Patient presents with    Routine Physical      Physical Exam  Vitals and nursing note reviewed.   Constitutional:       Appearance: She is well-developed.   HENT:      Head: Normocephalic and atraumatic.      Right Ear: External ear normal.      Left Ear: External ear normal.      Ears:      Comments: Left tm- erythematous, dull , patient mentions it has been bothering her      Nose: Nose normal.      Mouth/Throat:      Pharynx: No oropharyngeal exudate.   Eyes:      General:         Right eye: No discharge.         Left eye: No discharge.      Conjunctiva/sclera: Conjunctivae normal.      Pupils: Pupils are equal, round, and reactive to light.   Neck:      Thyroid: No thyromegaly.   Cardiovascular:      Rate and Rhythm: Normal rate and regular rhythm.      Heart sounds: Normal heart sounds. No murmur heard.  Pulmonary:      Effort: Pulmonary effort is normal.      Breath sounds: Normal breath sounds. No wheezing.   Abdominal:      General: Bowel sounds are normal.      Palpations: Abdomen is soft. There is no mass.      Tenderness: There is no abdominal tenderness.   Musculoskeletal:         General: No tenderness.      Cervical back: Normal range of motion and neck supple.   Lymphadenopathy:      Cervical: No cervical adenopathy.   Skin:     General: Skin is dry.      Findings: No rash.   Neurological:      Mental Status: She is alert and oriented to  person, place, and time.      Cranial Nerves: No cranial nerve deficit.      Motor: No abnormal muscle tone.      Coordination: Coordination normal.      Deep Tendon Reflexes: Reflexes are normal and symmetric. Reflexes normal.   Psychiatric:         Behavior: Behavior normal.         Thought Content: Thought content normal.         Judgment: Judgment normal.                ASSESSMENT/PLAN:     Return yearly for physicals  Follow up with dentist every 6 months  Follow up with eye doctor yearly  Recommend aerobic exercise for at least 30mins 5 days a week  Yearly flu shot  Tetanus booster every 10 years (Tdap/ Td)  Labs ordered/ or reviewed if done prior to appointment     Encounter Diagnoses   Name Primary?    Well adult exam Yes    Iron deficiency     Vitamin D deficiency     Prediabetes     Acute serous otitis media of left ear, recurrence not specified        1. Well adult exam  Recent lab reviewed from May 2024    2. Iron deficiency    - CBC With Differential With Platelet; Future  - Iron And Tibc; Future    3. Vitamin D deficiency  replaced    4. Prediabetes  Work on weight loss  Has appointment with weight management next week    5. Acute serous otitis media of left ear, recurrence not specified  Take medications as directed. Side effects/ risks discussed and patient verbalized understanding of plan. To follow up if symptoms worsen.    - amoxicillin 875 MG Oral Tab; Take 1 tablet (875 mg total) by mouth 2 (two) times daily for 10 days.  Dispense: 20 tablet; Refill: 0      Orders Placed This Encounter   Procedures    CBC With Differential With Platelet    Iron And Tibc       The above note was creating using Dragon speech recognition technology. Please excuse any typos    Meds This Visit:  Requested Prescriptions     Signed Prescriptions Disp Refills    amoxicillin 875 MG Oral Tab 20 tablet 0     Sig: Take 1 tablet (875 mg total) by mouth 2 (two) times daily for 10 days.       Imaging & Referrals:  None        ID#[id number]

## 2024-07-09 ENCOUNTER — OFFICE VISIT (OUTPATIENT)
Dept: SURGERY | Facility: CLINIC | Age: 29
End: 2024-07-09
Payer: COMMERCIAL

## 2024-07-09 VITALS
WEIGHT: 293 LBS | HEART RATE: 95 BPM | SYSTOLIC BLOOD PRESSURE: 102 MMHG | BODY MASS INDEX: 45.99 KG/M2 | DIASTOLIC BLOOD PRESSURE: 70 MMHG | HEIGHT: 67 IN | OXYGEN SATURATION: 100 %

## 2024-07-09 DIAGNOSIS — R73.03 PRE-DIABETES: ICD-10-CM

## 2024-07-09 DIAGNOSIS — E61.1 IRON DEFICIENCY: Primary | ICD-10-CM

## 2024-07-09 DIAGNOSIS — E55.9 VITAMIN D DEFICIENCY: ICD-10-CM

## 2024-07-09 DIAGNOSIS — E66.01 MORBID OBESITY WITH BMI OF 45.0-49.9, ADULT (HCC): ICD-10-CM

## 2024-07-09 PROCEDURE — 3074F SYST BP LT 130 MM HG: CPT | Performed by: INTERNAL MEDICINE

## 2024-07-09 PROCEDURE — 3078F DIAST BP <80 MM HG: CPT | Performed by: INTERNAL MEDICINE

## 2024-07-09 PROCEDURE — 3008F BODY MASS INDEX DOCD: CPT | Performed by: INTERNAL MEDICINE

## 2024-07-09 PROCEDURE — 99205 OFFICE O/P NEW HI 60 MIN: CPT | Performed by: INTERNAL MEDICINE

## 2024-07-09 NOTE — PROGRESS NOTES
[facility] Consultation Note       Patient:  Dominique Low  :      3/18/1995  MRN:      [id number]    Referring Provider: Dr. Cordova       Chief Complaint:    Chief Complaint   Patient presents with    Consult    Weight Management       SUBJECTIVE     History of Present Illness:  Dominique Low has been referred to me for evaluation and treatment.       30 yo who lives with family  Mom does the cooking  Desk job  Currently at heaviest weight    Patient has tried several diets in the past including exercises and is frustrated with increase of weight. Weight has been a struggle for the past several years and is now starting to develop into co-morbidities that are worrisome to the patient. Patient is interested in losing weight, so it can stay off long term.    Patient also understands that this is a life style change and wants to get on track.    Interested in non surgical weight loss    Past Medical History:   Past Medical History:    Abnormal uterine bleeding    I went to ER for heavy blood which was not related to period    Allergic rhinitis    Anemia    Mild    Dysmenorrhea    always have really bad cramps.    Esophageal reflux    Gastritis    Morbid obesity with BMI of 45.0-49.9, adult (HCC)    Ovarian cyst    per pt h/x     Pre-diabetes       OBJECTIVE     Vitals: /70 (BP Location: Left arm, Patient Position: Sitting, Cuff Size: adult)   Pulse 95   Ht 5' 7\" (1.702 m)   Wt (!) 307 lb (139.3 kg)   LMP 2024 (Exact Date)   SpO2 100%   BMI 48.08 kg/m²      Patient Medications:    Current Outpatient Medications   Medication Sig Dispense Refill    Cholecalciferol (VITAMIN D) 50 MCG ( UT) Oral Tab Take by mouth.      Ascorbic Acid (VITAMIN C) 100 MG Oral Tab Take 1 tablet (100 mg total) by mouth daily.      folic acid 1 MG Oral Tab Take by mouth daily.      amoxicillin 875 MG Oral Tab Take 1 tablet (875 mg total) by mouth 2 (two) times daily for 10 days. 20  tablet 0    Ferrous Gluconate 324 (37.5 Fe) MG Oral Tab Take 1 tablet (324 mg total) by mouth daily. 90 tablet 3    tranexamic acid 650 MG Oral Tab Take 2 tablets (1,300 mg total) by mouth in the morning, at noon, and at bedtime. 90 tablet 3    Omeprazole Magnesium 20 MG Oral Tab EC Take 1 tablet (20 mg total) by mouth daily. 90 tablet 0    fluticasone propionate 50 MCG/ACT Nasal Suspension 2 sprays by Nasal route daily. 3 each 0    levocetirizine 5 MG Oral Tab Take 1 tablet (5 mg total) by mouth nightly. 90 tablet 0    triamcinolone acetonide 0.1 % External Ointment Applied 2 times per day as needed 60 g 0       Allergies:  Patient has no known allergies.     Comorbidities:  pre diabetes    Social History:    Social History     Socioeconomic History    Marital status:      Spouse name: Not on file    Number of children: Not on file    Years of education: Not on file    Highest education level: Not on file   Occupational History    Occupation:     Tobacco Use    Smoking status: Never    Smokeless tobacco: Never    Tobacco comments:     N/A   Vaping Use    Vaping status: Never Used   Substance and Sexual Activity    Alcohol use: Yes     Comment: occasionally    Drug use: Never    Sexual activity: Yes     Partners: Male   Other Topics Concern    Caffeine Concern No    Exercise No    Seat Belt No    Special Diet No    Stress Concern No    Weight Concern Yes     Comment: I am currently over weight, need to loose some weight     Service Not Asked    Blood Transfusions No    Occupational Exposure Not Asked    Hobby Hazards Not Asked    Sleep Concern Not Asked    Back Care Not Asked    Bike Helmet Not Asked    Self-Exams Not Asked   Social History Narrative    Not on file     Social Determinants of Health     Financial Resource Strain: Not on file   Food Insecurity: Not on file   Transportation Needs: Not on file   Physical Activity: Not on file   Stress: Not on file   Social Connections: Not on  file   Housing Stability: Not on file     Surgical History:    Past Surgical History:   Procedure Laterality Date    Tonsillectomy         Family History:    Family History   Problem Relation Age of Onset    No Known Problems Father     No Known Problems Mother     Other (Muscular dystophy) Sister     Diabetes Maternal Grandmother     Hypertension Maternal Grandmother     Cancer Paternal Grandmother         cancerous brain tumor    Other (brain cancer) Paternal Grandmother 68    Cancer Paternal Grandfather         Stomach cancer    Other (gastric cancer) Paternal Grandfather 60    Breast Cancer Neg     Ovarian Cancer Neg     Uterine Cancer Neg            Typical Dietary Intake:  Breakfast AM Snack Lunch PM Snack Dinner   Sweet bread, coffee, gurvinder machiato, banana Popcorn, chocolate JJ, chips, lemonade, strawberries  Steak, beans, rice,    +sweet eater  Portions    Soda Drinker?: No  If yes, how much?:      Number of restaurant or fast food meals/week:  3 meals/week    Nutritional Goals Reviewed and Discussed:     Limit carbohydrates to 100 gms per day, Eat 100-200 calories within 1 hour of waking up, and Eat 3-4 cups of fresh fruit or vegetables daily    Behavior Modifications Reviewed and Discussed:    Eat breakfast, Eat 3 meals per day, Plan meals in advance, Read nutrition labels, Drink 64oz of water per day, Maintain a daily food journal, No drinking 30 minutes before or after meals, Utilize portion control strategies to reduce calorie intake, Identify triggers for eating and manage cues, and Eat slowly and take 20 to 30 minutes to complete each meal      ROS:  Constitutional: negative  Respiratory: negative  Cardiovascular: negative  Gastrointestinal: negative  Musculoskeletal:negative  Neurological: negative  Behavioral/Psych: negative  Endocrine: negative  All other systems were reviewed and are negative.    Physical Exam:  General appearance: alert, appears stated age, cooperative, and morbidly  obese  Head: Normocephalic, without obvious abnormality, atraumatic  Back: symmetric, no curvature. ROM normal. No CVA tenderness.  Lungs: clear to auscultation bilaterally  Abdomen:  soft, obese, non tender  Extremities: extremities normal, atraumatic, no cyanosis or edema  Pulses: 2+ and symmetric  Skin: Skin color, texture, turgor normal. No rashes or lesions  Neurologic: Grossly normal    ASSESSMENT         Encounter Diagnosis(ses):   1. Iron deficiency    2. Vitamin D deficiency    3. Pre-diabetes    4. Morbid obesity with BMI of 45.0-49.9, adult ([facility])        PLAN     Patient is not interested in bariatric surgery. Patient desires to pursue traditional weight loss at this time.      Pre diabetes: should improve with diet and exercise    Goals for next month:  1. Keep a food log.  2. Drink 48-64 ounces of non-caloric beverages per day. No fruit juices or regular soda.  3. Increase activity-upper body exercises, walk 10 minutes per day.  4. Increase fruit and vegetable servings to 5-6 per day.      Will start Wegovy      Needs to start birth control  Aware of birth defect possibility with GLP meds    Cut back on carb intake    Increase physical activity        Diagnoses and all orders for this visit:    Iron deficiency    Vitamin D deficiency    Pre-diabetes    Morbid obesity with BMI of 45.0-49.9, adult ([facility])        Omi Russ MD

## 2024-07-12 DIAGNOSIS — E66.01 MORBID OBESITY WITH BMI OF 45.0-49.9, ADULT (HCC): ICD-10-CM

## 2024-08-04 ENCOUNTER — HOSPITAL ENCOUNTER (OUTPATIENT)
Age: 29
Discharge: HOME | End: 2024-08-04
Payer: COMMERCIAL

## 2024-08-04 VITALS
DIASTOLIC BLOOD PRESSURE: 64 MMHG | HEART RATE: 95 BPM | TEMPERATURE: 97 F | RESPIRATION RATE: 19 BRPM | OXYGEN SATURATION: 99 % | SYSTOLIC BLOOD PRESSURE: 123 MMHG

## 2024-08-04 DIAGNOSIS — J02.9 VIRAL PHARYNGITIS: Primary | ICD-10-CM

## 2024-08-04 LAB
S PYO AG THROAT QL: NEGATIVE
SARS-COV-2 RNA RESP QL NAA+PROBE: NOT DETECTED

## 2024-08-04 NOTE — DISCHARGE INSTRUCTIONS
Strep and COVID are negative.  Symptoms appear viral.  Take Tylenol or Motrin as needed for pain, drink plenty of water, suck on throat lozenges.  Symptoms should resolve within the next few days.  Schedule follow-up with your primary doctor if no improvement

## 2024-08-04 NOTE — ED PROVIDER NOTES
Patient Seen in: [facility]      History     Chief Complaint   Patient presents with    Sore Throat     Stated Complaint: Sore Throat  Subjective:   29-year-old female with no past medical history presents from home.  Patient is here requesting a COVID test.  States she started having a sore throat yesterday.  Slight increase in pain with swallowing.  She has been taking Tylenol for pain.  States her boss tested positive for COVID 3 days ago.  She would like a COVID test.  She did not take a COVID test at home as hers were .  She is a non-smoker.  She is vaccinated for COVID.    The history is provided by the patient. No  was used.     Objective:   No pertinent past medical history.          No pertinent past surgical history.            No pertinent social history.          Review of Systems    Positive for stated complaint: Sore Throat    Other systems are as noted in HPI.  Constitutional and vital signs reviewed.      All other systems reviewed and negative except as noted above.    Physical Exam     ED Triage Vitals [24 1039]   /64   Pulse 113   Resp 19   Temp 97.3 °F (36.3 °C)   Temp src Temporal   SpO2 99 %   O2 Device None (Room air)     Current:/64   Pulse 95   Temp 97.3 °F (36.3 °C) (Temporal)   Resp 19   LMP 2024 (Exact Date)   SpO2 99%     Physical Exam  Vitals and nursing note reviewed.   Constitutional:       General: She is not in acute distress.     Appearance: Normal appearance. She is not ill-appearing or toxic-appearing.   HENT:      Head: Normocephalic and atraumatic.      Right Ear: Tympanic membrane, ear canal and external ear normal.      Left Ear: Tympanic membrane, ear canal and external ear normal.      Nose: Nose normal.      Mouth/Throat:      Mouth: Mucous membranes are moist.      Pharynx: Oropharynx is clear. No pharyngeal swelling or posterior oropharyngeal erythema.      Tonsils: No tonsillar exudate.   Eyes:       Pupils: Pupils are equal, round, and reactive to light.   Cardiovascular:      Rate and Rhythm: Normal rate and regular rhythm.      Pulses: Normal pulses.   Pulmonary:      Effort: Pulmonary effort is normal. No respiratory distress.      Breath sounds: Normal breath sounds.      Comments: Lungs clear.  No adventitious lung sounds.  No distress.  No hypoxia.  Pulse ox 99% ra. Which is normal    Abdominal:      General: Abdomen is flat.      Palpations: Abdomen is soft.   Musculoskeletal:         General: No signs of injury. Normal range of motion.      Cervical back: Normal range of motion and neck supple.   Lymphadenopathy:      Cervical: No cervical adenopathy.   Skin:     General: Skin is warm and dry.      Capillary Refill: Capillary refill takes less than 2 seconds.   Neurological:      General: No focal deficit present.      Mental Status: She is alert and oriented to person, place, and time.      GCS: GCS eye subscore is 4. GCS verbal subscore is 5. GCS motor subscore is 6.   Psychiatric:         Mood and Affect: Mood normal.         Behavior: Behavior normal.         Thought Content: Thought content normal.         Judgment: Judgment normal.         ED Course   Radiology:  No results found.  Labs Reviewed   POCT RAPID STREP - Normal   RAPID SARS-COV-2 BY PCR - Normal       MDM     Medical Decision Making  Differential diagnoses reflecting the complexity of care include: Strep, COVID, other viral URI  COVID-negative  Strep negative  Patient is well-appearing.  Symptoms appear viral    Plan of Care: Tylenol, Motrin, salt water gargles    Results and plan of care discussed with the patient/family. They are in agreement with discharge. They understand to follow up with their primary doctor or the referral physician for further evaluation, especially if no improvement.  Also discussed the limitations of immediate care, patient is aware that if symptoms are worse they should go to the emergency room. Verbal and  written discharge instructions were given.             Problems Addressed:  Viral pharyngitis: acute illness or injury    Amount and/or Complexity of Data Reviewed  Independent Historian: spouse  Labs: ordered. Decision-making details documented in ED Course.    Risk  OTC drugs.        Disposition and Plan     Clinical Impression:  1. Viral pharyngitis         Disposition:  Discharge  8/4/2024 11:32 am    Follow-up:  Nic Cordova MD  [address] [place], [place] [id number] [phone]                Medications Prescribed:  Discharge Medication List as of 8/4/2024 11:36 AM

## 2024-08-13 ENCOUNTER — PATIENT MESSAGE (OUTPATIENT)
Dept: FAMILY MEDICINE CLINIC | Facility: CLINIC | Age: 29
End: 2024-08-13

## 2024-08-13 ENCOUNTER — HOSPITAL ENCOUNTER (OUTPATIENT)
Age: 29
Discharge: HOME | End: 2024-08-13
Payer: COMMERCIAL

## 2024-08-13 ENCOUNTER — APPOINTMENT (OUTPATIENT)
Dept: GENERAL RADIOLOGY | Age: 29
End: 2024-08-13
Attending: NURSE PRACTITIONER
Payer: COMMERCIAL

## 2024-08-13 VITALS
OXYGEN SATURATION: 98 % | TEMPERATURE: 98 F | DIASTOLIC BLOOD PRESSURE: 62 MMHG | HEART RATE: 87 BPM | RESPIRATION RATE: 18 BRPM | SYSTOLIC BLOOD PRESSURE: 129 MMHG

## 2024-08-13 DIAGNOSIS — R05.1 ACUTE COUGH: Primary | ICD-10-CM

## 2024-08-13 DIAGNOSIS — Z86.16 HISTORY OF COVID-19: ICD-10-CM

## 2024-08-13 PROCEDURE — 99213 OFFICE O/P EST LOW 20 MIN: CPT | Performed by: NURSE PRACTITIONER

## 2024-08-13 PROCEDURE — 71046 X-RAY EXAM CHEST 2 VIEWS: CPT | Performed by: NURSE PRACTITIONER

## 2024-08-13 RX ORDER — ALBUTEROL SULFATE 90 UG/1
2 AEROSOL, METERED RESPIRATORY (INHALATION) EVERY 4 HOURS PRN
Qty: 1 EACH | Refills: 0 | Status: SHIPPED | OUTPATIENT
Start: 2024-08-13 | End: 2024-09-12

## 2024-08-13 RX ORDER — CODEINE PHOSPHATE AND GUAIFENESIN 10; 100 MG/5ML; MG/5ML
5 SOLUTION ORAL EVERY 6 HOURS PRN
Qty: 118 ML | Refills: 0 | Status: SHIPPED | OUTPATIENT
Start: 2024-08-13

## 2024-08-13 NOTE — DISCHARGE INSTRUCTIONS
Follow-up with your primary care provider in a week because you had COVID you could be coughing weeks to months.  Take over-the-counter ibuprofen for pain.  Drink plenty of fluids warm tea with honey recommend over-the-counter Flonase nasal spray and Zyrtec as you may be getting postnasal drip while you are sleeping that could contribute to the cough.  Take the cough medication as prescribed.  Run a humidifier at home if you have access to one.  If you develop high fever chest pain think you are having a heart attack shortness of breath and you cannot walk and talk and speak in a sentence vomiting or diarrhea feel lightheaded or dizziness weakness numbness or tingling to extremities go to the nearest emergency department.

## 2024-08-13 NOTE — ED PROVIDER NOTES
Patient Seen in: [facility]      History     Chief Complaint   Patient presents with    Cough     Tested at home covid test last week positive and feeling better but my cough is getting worse. I feel like I am loosing air. - Entered by patient     Stated Complaint: Cough - Tested at home covid test last week positive and feeling better but my *    Subjective:   HPI    This is a 29-year-old female with history of allergic rhinitis anemia esophageal reflux presenting with a lingering cough.  Patient states that she is post COVID tested positive at the beginning of the month everything is better except she still has the cough and at times with the cough she feels chest pain and shortness of breath.  Patient states she has been doing over-the-counter cough medication with little relief in symptoms.  Denies any active chest pain or shortness of breath just feels it with coughing.  Denies nausea or vomiting fever.    Objective:   Past Medical History:    Abnormal uterine bleeding    I went to ER for heavy blood which was not related to period    Allergic rhinitis    Anemia    Mild    Dysmenorrhea    always have really bad cramps.    Esophageal reflux    Gastritis    Morbid obesity with BMI of 45.0-49.9, adult (HCC)    Ovarian cyst    per pt h/x     Pre-diabetes              Past Surgical History:   Procedure Laterality Date    Tonsillectomy                  Social History     Socioeconomic History    Marital status:    Occupational History    Occupation:     Tobacco Use    Smoking status: Never    Smokeless tobacco: Never    Tobacco comments:     N/A   Vaping Use    Vaping status: Never Used   Substance and Sexual Activity    Alcohol use: Yes     Comment: occasionally    Drug use: Never    Sexual activity: Yes     Partners: Male   Other Topics Concern    Caffeine Concern No    Exercise No    Seat Belt No    Special Diet No    Stress Concern No    Weight Concern Yes     Comment: I am currently  over weight, need to loose some weight    Blood Transfusions No              Review of Systems    Positive for stated Chief Complaint: Cough (Tested at home covid test last week positive and feeling better but my cough is getting worse. I feel like I am loosing air. - Entered by patient)    Other systems are as noted in HPI.  Constitutional and vital signs reviewed.      All other systems reviewed and negative except as noted above.    Physical Exam     ED Triage Vitals [08/13/24 1230]   /62   Pulse 87   Resp 18   Temp 98.1 °F (36.7 °C)   Temp src Temporal   SpO2 98 %   O2 Device None (Room air)       Current Vitals:   Vital Signs  BP: 129/62  Pulse: 87  Resp: 18  Temp: 98.1 °F (36.7 °C)  Temp src: Temporal    Oxygen Therapy  SpO2: 98 %  O2 Device: None (Room air)            Physical Exam  Vitals and nursing note reviewed.   Constitutional:       Appearance: Normal appearance.   HENT:      Right Ear: Tympanic membrane normal.      Left Ear: Tympanic membrane normal.      Nose: Nose normal.      Mouth/Throat:      Mouth: Mucous membranes are moist.      Pharynx: Oropharynx is clear.   Eyes:      Conjunctiva/sclera: Conjunctivae normal.   Cardiovascular:      Rate and Rhythm: Normal rate.   Pulmonary:      Effort: Pulmonary effort is normal. No respiratory distress.      Breath sounds: Normal breath sounds. No wheezing.      Comments: + dry cough during exam  Musculoskeletal:         General: Normal range of motion.      Cervical back: Normal range of motion.   Neurological:      Mental Status: She is alert and oriented to person, place, and time.               ED Course   Labs Reviewed - No data to display                   MDM               Medical Decision Making  29-year-old female nontoxic-appearing coughing during exam history of COVID.  DDx lingering COVID symptoms versus bronchitis versus another respiratory or viral illness versus pneumonia.  No clinical indication for labs or advanced imaging a chest  x-ray will be ordered this was discussed with the patient and she is agreeable with this plan of care.    Chest x-ray independently viewed by this provider no pneumonia    Discussed results with the patient discussed cough is likely lingering from COVID.  Discussed prescribing albuterol inhaler guaifenesin with codeine to help with the cough per the patient she has done this combination in the past when she has had a cough and it has helped.  Recommended over-the-counter allergy medication Flonase and Zyrtec in the event she is getting postnasal drip that may be contributing to the cough.  Discussed outpatient follow-up and strict ER precautions with any new or worsening symptoms.  Patient agreeable with the plan of care and acknowledges understanding discharge instructions.    Problems Addressed:  Acute cough: acute illness or injury  History of COVID-19: acute illness or injury    Amount and/or Complexity of Data Reviewed  Radiology: ordered and independent interpretation performed. Decision-making details documented in ED Course.    Risk  OTC drugs.  Prescription drug management.        Disposition and Plan     Clinical Impression:  1. Acute cough    2. History of COVID-19         Disposition:  Discharge  8/13/2024  1:10 pm    Follow-up:  Nic Cordova MD  [address] [place] [id number]  [phone]    In 1 week            Medications Prescribed:  Discharge Medication List as of 8/13/2024  1:16 PM        START taking these medications    Details   albuterol 108 (90 Base) MCG/ACT Inhalation Aero Soln Inhale 2 puffs into the lungs every 4 (four) hours as needed for Wheezing., Normal, Disp-1 each, R-0      guaiFENesin-codeine (CHERATUSSIN AC) 100-10 MG/5ML Oral Solution Take 5 mL by mouth every 6 (six) hours as needed for cough., Normal, Disp-118 mL, R-0

## 2024-08-13 NOTE — ED INITIAL ASSESSMENT (HPI)
Pt with worsening cough since Sunday. Pt also reports shortness of breath and chest pain. Pt stated that she tested positive for Covid on 8/5. Pt stated that initial symptoms started on 8/2 and last known fever was on Wednesday.

## 2024-08-23 ENCOUNTER — TELEPHONE (OUTPATIENT)
Dept: FAMILY MEDICINE CLINIC | Facility: CLINIC | Age: 29
End: 2024-08-23

## 2024-08-25 ENCOUNTER — APPOINTMENT (OUTPATIENT)
Dept: GENERAL RADIOLOGY | Age: 29
End: 2024-08-25
Attending: NURSE PRACTITIONER
Payer: COMMERCIAL

## 2024-08-25 ENCOUNTER — HOSPITAL ENCOUNTER (OUTPATIENT)
Age: 29
Discharge: HOME | End: 2024-08-25
Payer: COMMERCIAL

## 2024-08-25 VITALS
OXYGEN SATURATION: 98 % | DIASTOLIC BLOOD PRESSURE: 70 MMHG | RESPIRATION RATE: 20 BRPM | SYSTOLIC BLOOD PRESSURE: 120 MMHG | TEMPERATURE: 97 F | HEART RATE: 97 BPM

## 2024-08-25 DIAGNOSIS — R05.8 POST-VIRAL COUGH SYNDROME: Primary | ICD-10-CM

## 2024-08-25 DIAGNOSIS — R06.2 WHEEZING: ICD-10-CM

## 2024-08-25 PROCEDURE — 99213 OFFICE O/P EST LOW 20 MIN: CPT | Performed by: NURSE PRACTITIONER

## 2024-08-25 PROCEDURE — 94640 AIRWAY INHALATION TREATMENT: CPT | Performed by: EMERGENCY MEDICINE

## 2024-08-25 PROCEDURE — 71046 X-RAY EXAM CHEST 2 VIEWS: CPT | Performed by: NURSE PRACTITIONER

## 2024-08-25 RX ORDER — ALBUTEROL SULFATE 90 UG/1
2 AEROSOL, METERED RESPIRATORY (INHALATION) EVERY 4 HOURS PRN
Qty: 1 EACH | Refills: 0 | Status: SHIPPED | OUTPATIENT
Start: 2024-08-25 | End: 2024-09-24

## 2024-08-25 RX ORDER — PREDNISONE 20 MG/1
40 TABLET ORAL DAILY
Qty: 10 TABLET | Refills: 0 | Status: SHIPPED | OUTPATIENT
Start: 2024-08-25 | End: 2024-08-30

## 2024-08-25 RX ORDER — ALBUTEROL SULFATE 0.83 MG/ML
2.5 SOLUTION RESPIRATORY (INHALATION) ONCE
Status: COMPLETED | OUTPATIENT
Start: 2024-08-25 | End: 2024-08-25

## 2024-08-25 RX ORDER — BENZONATATE 200 MG/1
200 CAPSULE ORAL 3 TIMES DAILY PRN
Qty: 30 CAPSULE | Refills: 0 | Status: SHIPPED | OUTPATIENT
Start: 2024-08-25

## 2024-08-25 NOTE — ED PROVIDER NOTES
Chief Complaint   Patient presents with    Cough/URI       HPI:     Dominique Low is a 29 year old female who presents for evaluation and management of a chief complaint of cough and wheezing that has been worsening.  She tested positive for COVID 2 weeks ago on a home test.  She was seen here, prescribed albuterol inhaler, and Cheratussin, reports cough has not gotten better.  She has had no fever.  No chest pain or shortness of breath.  No nausea, vomiting, diarrhea, or abdominal pain.  She does report she has required albuterol inhaler with previous URIs.  No formal diagnosis of asthma.    PFSH  PFS asessment screens reviewed and agree.  Nursing note reviewed and I agree with documentation.    Family History   Problem Relation Age of Onset    No Known Problems Father     No Known Problems Mother     Other (Muscular dystophy) Sister     Diabetes Maternal Grandmother     Hypertension Maternal Grandmother     Cancer Paternal Grandmother         cancerous brain tumor    Other (brain cancer) Paternal Grandmother 68    Cancer Paternal Grandfather         Stomach cancer    Other (gastric cancer) Paternal Grandfather 60    Breast Cancer Neg     Ovarian Cancer Neg     Uterine Cancer Neg      Family history reviewed with patient/caregiver and is not pertinent to presenting problem.  Social History     Socioeconomic History    Marital status:      Spouse name: Not on file    Number of children: Not on file    Years of education: Not on file    Highest education level: Not on file   Occupational History    Occupation:     Tobacco Use    Smoking status: Never    Smokeless tobacco: Never    Tobacco comments:     N/A   Vaping Use    Vaping status: Never Used   Substance and Sexual Activity    Alcohol use: Yes     Comment: occasionally    Drug use: Never    Sexual activity: Yes     Partners: Male   Other Topics Concern    Caffeine Concern No    Exercise No    Seat Belt No    Special Diet No    Stress  Concern No    Weight Concern Yes     Comment: I am currently over weight, need to loose some weight     Service Not Asked    Blood Transfusions No    Occupational Exposure Not Asked    Hobby Hazards Not Asked    Sleep Concern Not Asked    Back Care Not Asked    Bike Helmet Not Asked    Self-Exams Not Asked   Social History Narrative    Not on file     Social Determinants of Health     Financial Resource Strain: Not on file   Food Insecurity: Not on file   Transportation Needs: Not on file   Physical Activity: Not on file   Stress: Not on file   Social Connections: Not on file   Housing Stability: Not on file        Findings:    /70   Pulse 97   Temp 97.1 °F (36.2 °C) (Temporal)   Resp 20   LMP 08/15/2024 (Exact Date)   SpO2 98%   GENERAL: well developed, well nourished, well hydrated, no distress  HEAD: normocephalic  NECK: supple, no adenopathy  EYES: sclera non icteric bilateral, conjunctiva clear  EARS: TM  bilateral: normal  NOSE: nasal turbinates: pink, normal mucosa  THROAT: clear, without exudates  CARDIO: RRR without murmur  LUNGS: wheezing bilaterally. Normal respiratory effort.  SKIN: good skin turgor, no obvious rashes    MDM/Assessment/Plan:   Orders for this encounter:  Orders Placed This Encounter    XR CHEST PA + LAT CHEST (CPT=71046)     Cough Pt presents to the IC with c/o a continued dry cough after being diagnosed   with covid approx 3 weeks ago. Pt has been using her prescribed albuterol   and cough medication without improvement. All other symptoms have   resolved. No fevers. Cough is non-productive.        Order Specific Question:   What is the Relevant Clinical Indication / Reason for Exam?     Answer:   Cough     Order Specific Question:   Release to patient     Answer:   Immediate    albuterol (Ventolin) (2.5 MG/3ML) 0.083% nebulizer solution 2.5 mg     Order Specific Question:   Which area/hospital     Answer:   Intermittent nebulizer    predniSONE 20 MG Oral Tab     Sig:  Take 2 tablets (40 mg total) by mouth daily for 5 days.     Dispense:  10 tablet     Refill:  0    benzonatate 200 MG Oral Cap     Sig: Take 1 capsule (200 mg total) by mouth 3 (three) times daily as needed.     Dispense:  30 capsule     Refill:  0    albuterol 108 (90 Base) MCG/ACT Inhalation Aero Soln     Sig: Inhale 2 puffs into the lungs every 4 (four) hours as needed for Wheezing.     Dispense:  1 each     Refill:  0       Labs performed this visit:  No results found for this or any previous visit (from the past 10 hour(s)).    MDM:   Medical Decision Making  Differentials considered: Postviral cough versus bronchitis versus pneumonia versus other    HPI and exam consistent with postviral cough with wheezing/bronchitis.  Patient is healthy appearing, normal vitals.  There is no pneumonia on chest x-ray.  Patient was given an albuterol nebulizer in clinic with improvement in symptoms.  Will send home with prednisone, tessalon Perles, continue albuterol inhaler.  She was advised to follow-up closely with primary care provider if no improvement.  ER precautions were discussed in great detail.  Patient verbalized understanding  and agreeable to plan of care.     Amount and/or Complexity of Data Reviewed  Radiology: ordered and independent interpretation performed. Decision-making details documented in ED Course.     Details: I personally viewed chest x-ray: No pneumonia    Risk  Prescription drug management.          Diagnosis:    ICD-10-CM    1. Post-viral cough syndrome  R05.8       2. Wheezing  R06.2           All results reviewed and discussed with patient.  See AVS for detailed discharge instructions for your condition today.    Follow Up with:  No follow-up provider specified.

## 2024-08-25 NOTE — ED INITIAL ASSESSMENT (HPI)
Pt presents to the IC with c/o a continued dry cough after being diagnosed with covid approx 3 weeks ago. Pt has been using her prescribed albuterol and cough medication without improvement. All other symptoms have resolved. No fevers. Cough is non-productive.

## 2024-08-25 NOTE — DISCHARGE INSTRUCTIONS
Prednisone 2 tablets daily for 5 days  Albuterol inhaler, 2 puffs every 4-6 hours as needed  For severe chest pain, shortness of breath, or worsening symptoms, please go to the ER  Please follow-up with primary care provider in 1 week if no improvement

## 2024-08-26 NOTE — TELEPHONE ENCOUNTER
Name:MARKELL                      2024 8:11:12 AM  ProfileId:                        [facility]  Department:[place] ANSWERING SERVICE  ======================================================================  Text Messages    Message # [id number]         2024 05:11p   [MARS]  To:  FRANCIE GUILLAUME  From:  WILMER Paz MD:  DUSTIN  Phone#:  [phone]  ----------------------------------------------------------------------  ; [birth year], RE; COUGH NOT GOING AWAY AFTER FINISHING [facility]

## 2024-08-26 NOTE — TELEPHONE ENCOUNTER
On-call late entry:  The patient states that the patient was tested positive for Covid 2 weeks ago. Her cough and wheezing are getting worse. Cheratussin AC and Albuterol inhaler do not help.  The patient denies fever, N/V, chest pain or dizziness.  Advise the patient to proceed to  for further evaluation and treatment. Patient verbalized understanding.

## 2024-08-26 NOTE — TELEPHONE ENCOUNTER
You are receiving this encounter because we received information you received this page while on call and we did not find a note in the chart about the advice/directives you provided to the patient. Please indicate your actions in this encounter.

## 2024-09-06 DIAGNOSIS — E66.01 MORBID OBESITY WITH BMI OF 45.0-49.9, ADULT (HCC): ICD-10-CM

## 2024-09-07 DIAGNOSIS — E66.01 MORBID OBESITY WITH BMI OF 45.0-49.9, ADULT (HCC): ICD-10-CM

## 2024-10-02 ENCOUNTER — TELEMEDICINE (OUTPATIENT)
Dept: SURGERY | Facility: CLINIC | Age: 29
End: 2024-10-02
Payer: COMMERCIAL

## 2024-10-02 VITALS — BODY MASS INDEX: 45.83 KG/M2 | HEIGHT: 67 IN | WEIGHT: 292 LBS

## 2024-10-02 DIAGNOSIS — R73.03 PRE-DIABETES: Primary | ICD-10-CM

## 2024-10-02 DIAGNOSIS — Z51.81 ENCOUNTER FOR THERAPEUTIC DRUG MONITORING: ICD-10-CM

## 2024-10-02 DIAGNOSIS — E66.01 MORBID OBESITY WITH BMI OF 45.0-49.9, ADULT (HCC): ICD-10-CM

## 2024-10-02 DIAGNOSIS — E61.1 IRON DEFICIENCY: ICD-10-CM

## 2024-10-02 PROCEDURE — 99214 OFFICE O/P EST MOD 30 MIN: CPT | Performed by: INTERNAL MEDICINE

## 2024-10-02 NOTE — PROGRESS NOTES
[facility], [place]  [address]  [place] [address]  Dept: [phone]     Virtual video Check-In    Dominique Low verbally consents to a Virtual/Telephone Check-In visit on 10/02/24.  Patient has been referred to the [facility] website at [email or web address] to review the yearly Consent to Treat document.    Patient understands and accepts financial responsibility for any deductible, co-insurance and/or co-pays associated with this service.    Duration of the service: 22 minutes      video          Patient:  Dominique Low  :      3/18/1995  MRN:      [id number]    Chief Complaint:    Chief Complaint   Patient presents with    Follow - Up     Non surgical weight loss. Video visit       SUBJECTIVE     History of Present Illness:  Dominique is being seen today for a follow-up for non surgical weight loss    Past Medical History:   Past Medical History:    Abnormal uterine bleeding    I went to ER for heavy blood which was not related to period    Allergic rhinitis    Anemia    Mild    Dysmenorrhea    always have really bad cramps.    Esophageal reflux    Gastritis    Morbid obesity with BMI of 45.0-49.9, adult (HCC)    Ovarian cyst    per pt h/x     Pre-diabetes        Comorbidities:  Back pain-Improvement?  yes, Joint pain-Improvement?  yes, and ERIKA-Improvement?  yes    OBJECTIVE     Vitals: Ht 5' 7\" (1.702 m)   Wt 292 lb (132.5 kg)   LMP 08/15/2024 (Exact Date)   BMI 45.73 kg/m²     Initial weight loss: -15   Total weight loss: -15    Start weight: 307    Wt Readings from Last 3 Encounters:   10/02/24 292 lb (132.5 kg)   24 (!) 307 lb (139.3 kg)   24 (!) 307 lb 12.8 oz (139.6 kg)       Patient Medications:    Current Outpatient Medications   Medication Sig Dispense Refill    semaglutide-weight management 0.5 MG/0.5ML Subcutaneous Solution Auto-injector Inject 0.5 mL (0.5 mg total) into the skin once a week for 4  doses. 2 mL 2    benzonatate 200 MG Oral Cap Take 1 capsule (200 mg total) by mouth 3 (three) times daily as needed. 30 capsule 0    guaiFENesin-codeine (CHERATUSSIN AC) 100-10 MG/5ML Oral Solution Take 5 mL by mouth every 6 (six) hours as needed for cough. 118 mL 0    Cholecalciferol (VITAMIN D) 50 MCG (2000 UT) Oral Tab Take by mouth.      Ascorbic Acid (VITAMIN C) 100 MG Oral Tab Take 1 tablet (100 mg total) by mouth daily.      folic acid 1 MG Oral Tab Take by mouth daily.      Ferrous Gluconate 324 (37.5 Fe) MG Oral Tab Take 1 tablet (324 mg total) by mouth daily. 90 tablet 3    tranexamic acid 650 MG Oral Tab Take 2 tablets (1,300 mg total) by mouth in the morning, at noon, and at bedtime. 90 tablet 3    Omeprazole Magnesium 20 MG Oral Tab EC Take 1 tablet (20 mg total) by mouth daily. 90 tablet 0    fluticasone propionate 50 MCG/ACT Nasal Suspension 2 sprays by Nasal route daily. 3 each 0    levocetirizine 5 MG Oral Tab Take 1 tablet (5 mg total) by mouth nightly. 90 tablet 0    triamcinolone acetonide 0.1 % External Ointment Applied 2 times per day as needed 60 g 0     Allergies:  Patient has no known allergies.     Social History:    Social History     Socioeconomic History    Marital status:      Spouse name: Not on file    Number of children: Not on file    Years of education: Not on file    Highest education level: Not on file   Occupational History    Occupation:     Tobacco Use    Smoking status: Never    Smokeless tobacco: Never    Tobacco comments:     N/A   Vaping Use    Vaping status: Never Used   Substance and Sexual Activity    Alcohol use: Yes     Comment: occasionally    Drug use: Never    Sexual activity: Yes     Partners: Male   Other Topics Concern    Caffeine Concern No    Exercise No    Seat Belt No    Special Diet No    Stress Concern No    Weight Concern Yes     Comment: I am currently over weight, need to loose some weight     Service Not Asked    Blood  Transfusions No    Occupational Exposure Not Asked    Hobby Hazards Not Asked    Sleep Concern Not Asked    Back Care Not Asked    Bike Helmet Not Asked    Self-Exams Not Asked   Social History Narrative    Not on file     Social Determinants of Health     Financial Resource Strain: Not on file   Food Insecurity: Not on file   Transportation Needs: Not on file   Physical Activity: Not on file   Stress: Not on file   Social Connections: Not on file   Housing Stability: Not on file     Surgical History:    Past Surgical History:   Procedure Laterality Date    Tonsillectomy       Family History:    Family History   Problem Relation Age of Onset    No Known Problems Father     No Known Problems Mother     Other (Muscular dystophy) Sister     Diabetes Maternal Grandmother     Hypertension Maternal Grandmother     Cancer Paternal Grandmother         cancerous brain tumor    Other (brain cancer) Paternal Grandmother 68    Cancer Paternal Grandfather         Stomach cancer    Other (gastric cancer) Paternal Grandfather 60    Breast Cancer Neg     Ovarian Cancer Neg     Uterine Cancer Neg        Food Journal  Reviewed and Discussed:       Patient has a Food Journal?: yes   Patient is reading nutrition labels?  yes  Average Caloric Intake:     Average CHO Intake: 130  Is patient exercising? yes  Type of exercise? 3 miles on bike  Weights  Push ups  walking    Eating Habits  Patient states the following:  Eats 3 meal(s) per day  Length of time it takes to consume a meal:  20  # of snacks per day: 1 Type of snacks:  fruit  Amount of soda consumption per day:    Amount of water (in ounces) per day:  64  Drinking between meals only:  yes  Toughest challenge:      Nutritional Goals  Limit carbohydrates to 100 gms per day, Eat 100-200 calories within 1 hour of waking , and Eat 3-4 cups of fresh fruits or vegetables daily    Behavior Modifications Reviewed and Discussed  Eat breakfast, Eat 3 meals per day, Plan meals in advance,  Read nutrition labels, Drink 64 oz of water per day, Maintain a daily food journal, No drinking 30 minutes before or after meals, Utlize portion control strategies to reduce calorie intake, Identify triggers for eating and manage cues, and Eat slowly and take 20 to 30 minutes to complete each meal    Exercise Goals Reviewed and Discussed    Continue to increase as tolerated    ROS:    Constitutional: negative  Respiratory: negative  Cardiovascular: negative  Gastrointestinal: negative  Musculoskeletal:negative  Neurological: negative  Behavioral/Psych: negative  Endocrine: negative  All other systems were reviewed and are negative    Physical Exam:   Limited due to tele visit  Awake and alert  Speaking full sentences      ASSESSMENT       Encounter Diagnosis(ses):   Encounter Diagnoses   Name Primary?    Pre-diabetes Yes    Iron deficiency     Encounter for therapeutic drug monitoring     Morbid obesity with BMI of 45.0-49.9, adult ([facility])        PLAN     Patient is not interested in bariatric surgery. Patient desires to pursue traditional weight loss at this time.      Pre diabetes: may improve with diet and exercise    Goals for next month:  1. Keep a food log.  2. Drink 48-64 ounces of non-caloric beverages per day. No fruit juices or regular soda.  3. Increase activity-upper body exercises, walk 10 minutes per day.  4. Increase fruit and vegetable servings to 5-6 per day.      Tolerating Wegovy  Increase to 0.5 mg    Has good motivation    Feels better    Diagnoses and all orders for this visit:    Pre-diabetes    Iron deficiency    Encounter for therapeutic drug monitoring    Morbid obesity with BMI of 45.0-49.9, adult ([facility])  -     semaglutide-weight management 0.5 MG/0.5ML Subcutaneous Solution Auto-injector; Inject 0.5 mL (0.5 mg total) into the skin once a week for 4 doses.          Omi Russ MD

## 2024-10-03 DIAGNOSIS — E66.01 MORBID OBESITY WITH BMI OF 45.0-49.9, ADULT (HCC): ICD-10-CM

## 2024-12-04 ENCOUNTER — APPOINTMENT (OUTPATIENT)
Dept: GENERAL RADIOLOGY | Age: 29
End: 2024-12-04
Attending: NURSE PRACTITIONER
Payer: COMMERCIAL

## 2024-12-04 ENCOUNTER — HOSPITAL ENCOUNTER (OUTPATIENT)
Age: 29
Discharge: HOME | End: 2024-12-04
Payer: COMMERCIAL

## 2024-12-04 VITALS
SYSTOLIC BLOOD PRESSURE: 124 MMHG | RESPIRATION RATE: 19 BRPM | TEMPERATURE: 98 F | DIASTOLIC BLOOD PRESSURE: 56 MMHG | OXYGEN SATURATION: 100 % | HEART RATE: 94 BPM

## 2024-12-04 DIAGNOSIS — J18.9 PNEUMONIA OF RIGHT LOWER LOBE DUE TO INFECTIOUS ORGANISM: Primary | ICD-10-CM

## 2024-12-04 LAB — S PYO AG THROAT QL IA.RAPID: NEGATIVE

## 2024-12-04 PROCEDURE — 99213 OFFICE O/P EST LOW 20 MIN: CPT

## 2024-12-04 PROCEDURE — 71046 X-RAY EXAM CHEST 2 VIEWS: CPT | Performed by: NURSE PRACTITIONER

## 2024-12-04 PROCEDURE — 87651 STREP A DNA AMP PROBE: CPT | Performed by: EMERGENCY MEDICINE

## 2024-12-04 PROCEDURE — 99214 OFFICE O/P EST MOD 30 MIN: CPT

## 2024-12-04 NOTE — DISCHARGE INSTRUCTIONS
Cough, Adult  Coughing is a reflex that clears your throat and airways (respiratory system). It helps heal and protect your lungs. It is normal to cough from time to time. A cough that happens with other symptoms or that lasts a long time may be a sign of a condition that needs treatment. A short-term (acute) cough may only last 2-3 weeks. A long-term (chronic) cough may last 8 or more weeks.    Coughing is often caused by:  Diseases, such as:  An infection of the respiratory system.  Asthma or other heart or lung diseases.  Gastroesophageal reflux. This is when acid comes back up from the stomach.  Breathing in things that irritate your lungs.  Allergies.  Postnasal drip. This is when mucus runs down the back of your throat.  Smoking.  Some medicines.  Follow these instructions at home:  Medicines    Take over-the-counter and prescription medicines only as told by your health care provider.  Talk with your provider before you take cough medicine (cough suppressants).  Eating and drinking    Do not drink alcohol.  Avoid caffeine.  Drink enough fluid to keep your pee (urine) pale yellow.  Lifestyle    Avoid cigarette smoke.  Do not use any products that contain nicotine or tobacco. These products include cigarettes, chewing tobacco, and vaping devices, such as e-cigarettes. If you need help quitting, ask your provider.  Avoid things that make you cough. These may include perfumes, candles, cleaning products, or campfire smoke.  General instructions    A person holding a cloth over the mouth and nose while coughing.  Watch for any changes to your cough. Tell your provider about them.  Always cover your mouth when you cough.  If the air is dry in your bedroom or home, use a cool mist vaporizer or humidifier.  If your cough is worse at night, try to sleep in a semi-upright position.  Rest as needed.  Contact a health care provider if:  You have new symptoms, or your symptoms get worse.  You cough up pus.  You have a  fever that does not go away or a cough that does not get better after 2-3 weeks.  You cannot control your cough with medicine, and you are losing sleep.  You have pain that gets worse or is not helped with medicine.  You lose weight for no clear reason.  You have night sweats.  Get help right away if:  You cough up blood.  You have trouble breathing.  Your heart is beating very fast.

## 2024-12-04 NOTE — ED INITIAL ASSESSMENT (HPI)
Patient with congestion, earache, headache, cough and chills starting Saturday.  Denies fevers.  Negative at home covid test.  + strep and pneumonia exposure.

## 2024-12-04 NOTE — ED PROVIDER NOTES
Patient Seen in: [facility]      History     Chief Complaint   Patient presents with    Cold     Sore throat, cough, earache and headache - Entered by patient     Stated Complaint: Cold - Sore throat, cough, earache and headache    Subjective:   HPI      28yo female presents w/cough, body aches and sore throat. +exposures for strep and PNA. Not taking any home meds for these issues. Took home covid test and it was negative. Denies f/c/n/v/d. + recent sick exposures. Denies recent infections/covid exposures.       Objective:     Past Medical History:    Abnormal uterine bleeding    I went to ER for heavy blood which was not related to period    Allergic rhinitis    Anemia    Mild    Dysmenorrhea    always have really bad cramps.    Esophageal reflux    Gastritis    Morbid obesity with BMI of 45.0-49.9, adult (HCC)    Ovarian cyst    per pt h/x     Pre-diabetes              Past Surgical History:   Procedure Laterality Date    Tonsillectomy                  No pertinent social history.            Review of Systems    Positive for stated complaint: Cold - Sore throat, cough, earache and headache  Other systems are as noted in HPI.  Constitutional and vital signs reviewed.      All other systems reviewed and negative except as noted above.    Physical Exam     ED Triage Vitals [12/04/24 1045]   /56   Pulse 94   Resp 19   Temp 98.1 °F (36.7 °C)   Temp src Oral   SpO2 100 %   O2 Device None (Room air)       Current Vitals:   Vital Signs  BP: 124/56  Pulse: 94  Resp: 19  Temp: 98.1 °F (36.7 °C)  Temp src: Oral    Oxygen Therapy  SpO2: 100 %  O2 Device: None (Room air)        Physical Exam  Vitals and nursing note reviewed.   Constitutional:       General: She is not in acute distress.     Appearance: She is not toxic-appearing.   HENT:      Head: Normocephalic.      Right Ear: Tympanic membrane normal.      Left Ear: Tympanic membrane normal.      Nose: Nose normal. No congestion or rhinorrhea.       Mouth/Throat:      Mouth: Mucous membranes are moist.   Eyes:      General:         Right eye: No discharge.         Left eye: No discharge.      Pupils: Pupils are equal, round, and reactive to light.   Cardiovascular:      Rate and Rhythm: Normal rate.   Pulmonary:      Effort: Pulmonary effort is normal. No respiratory distress.      Breath sounds: No rhonchi or rales.   Chest:      Chest wall: No tenderness.   Abdominal:      General: Abdomen is flat. Bowel sounds are normal.   Musculoskeletal:         General: Normal range of motion.      Cervical back: Normal range of motion.   Skin:     General: Skin is warm and dry.      Capillary Refill: Capillary refill takes less than 2 seconds.   Neurological:      General: No focal deficit present.      Mental Status: She is alert.             ED Course     Labs Reviewed   RAPID STREP A - Normal                   MDM             Medical Decision Making  29 year old female p/w sore throat 5 days. Strep PCR is negative. Will send for xraay.   Xray of chest>I have directly viewed this exam, ? pna as clinically questioned. Pending rad read.     Xray>XR CHEST PA + LAT CHEST (CPT=71046)    Result Date: 12/4/2024  CONCLUSION:   Faint right lower lobe opacity.  This may represent subsegmental atelectasis.  Differential is pneumonia the appropriate clinical setting.    Dictated by (CST): Prema Suarez MD on 12/04/2024 at 11:28 AM     Finalized by (CST): Prema Suarez MD on 12/04/2024 at 11:29 AM            Plan:   - home with supportive care  - tylenol, motrin prn  - f/u with PCP    Physical exam remained stable over serial reexaminations as previously documented. External chart review completed, no recent hospitalizations for the same.     I have discussed with the patient the results of tests, differential diagnosis, and warning signs and symptoms that should prompt immediate return.  The patient understands these instructions and agrees to the follow-up plan provided.   There are no barriers to learning.   Appropriate f/u given.  Patient agrees to return for any concerns/problems/complications.    Differential diagnosis reflecting the complexity of care include: URI, sinusitis, covid, strep pharyngitis, viral pharyngitis, AOM, OME, OE    Comorbidities that add complexity to management include:na    External chart review was done and was noted:y    History obtained by an independent source was from:Parent/patient    Discussions of management was done with:na    My independent interpretation of studies of:na    Diagnostic tests and medications considered but not ordered were:none    Social determinants of health that affect care:na    Shared decision making was done by patient, self                Disposition and Plan     Clinical Impression:  1. Pneumonia of right lower lobe due to infectious organism         Disposition:  Discharge  12/4/2024 11:40 am    Follow-up:  No follow-up provider specified.        Medications Prescribed:  Discharge Medication List as of 12/4/2024 11:59 AM        START taking these medications    Details   amoxicillin clavulanate 875-125 MG Oral Tab Take 1 tablet by mouth 2 (two) times daily for 10 days., Normal, Disp-20 tablet, R-0                 Supplementary Documentation:

## 2024-12-07 ENCOUNTER — HOSPITAL ENCOUNTER (OUTPATIENT)
Age: 29
Discharge: HOME | End: 2024-12-07
Payer: COMMERCIAL

## 2024-12-07 ENCOUNTER — APPOINTMENT (OUTPATIENT)
Dept: GENERAL RADIOLOGY | Age: 29
End: 2024-12-07
Attending: PHYSICIAN ASSISTANT
Payer: COMMERCIAL

## 2024-12-07 VITALS
RESPIRATION RATE: 18 BRPM | SYSTOLIC BLOOD PRESSURE: 132 MMHG | TEMPERATURE: 99 F | OXYGEN SATURATION: 99 % | HEART RATE: 107 BPM | DIASTOLIC BLOOD PRESSURE: 68 MMHG

## 2024-12-07 DIAGNOSIS — R05.1 ACUTE COUGH: Primary | ICD-10-CM

## 2024-12-07 PROCEDURE — 71046 X-RAY EXAM CHEST 2 VIEWS: CPT | Performed by: PHYSICIAN ASSISTANT

## 2024-12-07 PROCEDURE — 99213 OFFICE O/P EST LOW 20 MIN: CPT | Performed by: PHYSICIAN ASSISTANT

## 2024-12-07 RX ORDER — ALBUTEROL SULFATE 90 UG/1
2 INHALANT RESPIRATORY (INHALATION) EVERY 4 HOURS PRN
Qty: 1 EACH | Refills: 0 | Status: SHIPPED | OUTPATIENT
Start: 2024-12-07 | End: 2025-01-06

## 2024-12-07 RX ORDER — BENZONATATE 200 MG/1
200 CAPSULE ORAL 3 TIMES DAILY PRN
Qty: 30 CAPSULE | Refills: 0 | Status: SHIPPED | OUTPATIENT
Start: 2024-12-07

## 2024-12-07 NOTE — ED PROVIDER NOTES
Patient Seen in: [facility]      History     Chief Complaint   Patient presents with    Cough     Stated Complaint: Dx w/ pnemonina wed. not improving    Subjective:   HPI      Patient is a 29-year-old female that presents to immediate care due to persistent cough.  Denying chest pain shortness of breath or wheezing.  Patient does note ear pain has been improving on antibiotics.  Patient requesting cough medication.    Objective:     Past Medical History:    Abnormal uterine bleeding    I went to ER for heavy blood which was not related to period    Allergic rhinitis    Anemia    Mild    Dysmenorrhea    always have really bad cramps.    Esophageal reflux    Gastritis    Morbid obesity with BMI of 45.0-49.9, adult (HCC)    Ovarian cyst    per pt h/x     Pre-diabetes              Past Surgical History:   Procedure Laterality Date    Tonsillectomy                  Social History     Socioeconomic History    Marital status:    Occupational History    Occupation:     Tobacco Use    Smoking status: Never    Smokeless tobacco: Never    Tobacco comments:     N/A   Vaping Use    Vaping status: Never Used   Substance and Sexual Activity    Alcohol use: Yes     Comment: occasionally    Drug use: Never    Sexual activity: Yes     Partners: Male   Other Topics Concern    Caffeine Concern No    Exercise No    Seat Belt No    Special Diet No    Stress Concern No    Weight Concern Yes     Comment: I am currently over weight, need to loose some weight    Blood Transfusions No              Review of Systems    Positive for stated complaint: Dx w/ pnemonina wed. not improving  Other systems are as noted in HPI.  Constitutional and vital signs reviewed.      All other systems reviewed and negative except as noted above.    Physical Exam     ED Triage Vitals [12/07/24 1203]   /68   Pulse 107   Resp 18   Temp 98.7 °F (37.1 °C)   Temp src Oral   SpO2 99 %   O2 Device None (Room air)       Current  Vitals:   Vital Signs  BP: 132/68  Pulse: 107  Resp: 18  Temp: 98.7 °F (37.1 °C)  Temp src: Oral    Oxygen Therapy  SpO2: 99 %  O2 Device: None (Room air)        Physical Exam  Vital signs reviewed. Nursing note reviewed.  Constitutional: Well-developed. Well-nourished. In no acute distress  HENT: Mucous membranes moist. TMs intact bilaterally. No trismus. Uvula midline. Mild posterior pharynx erythema.  No petechiae, exudates, or posterior pharynx edema.  EYES: No scleral icterus or conjunctival injection.  NECK: Full ROM. Supple.   CARDIAC: Normal rate. Normal S1/ S2. 2+ distal pulses. No edema  PULM/CHEST: Clear to auscultation bilaterally. No wheezes  Extremities: Full ROM  NEURO: Awake, alert, following commands, moving extremities, answering questions.   SKIN: Warm and dry. No rash or lesions.  PSYCH: Normal judgment. Normal affect.             MDM      Patient is a healthy 29-year-old female who presents to immediate care due to cough x 7 days.  Patient arrives with stable vitals speaking complete sentences in no respiratory distress.  Physical exam unremarkable with lungs clear to auscultation.  Most likely acute cough, recovering from pneumonia.  Less likely worsening pneumonia, repeat chest x-ray obtained today showing no acute consolidation.  Less likely otitis media, otitis externa.  Encourage patient to continue taking previously prescribed Augmentin.  Will prescribe albuterol inhaler, benzonatate for supportive treatment.  Discussed supportive treatment including Tylenol and ibuprofen as needed.  Antihistamine such as Claritin or Zyrtec and decongestant such as Sudafed.  Return precautions including worsening cough, fever chest pain shortness of breath.  History given by patient.  Patient agreeable to plan all questions answered.          Medical Decision Making      Disposition and Plan     Clinical Impression:  1. Acute cough         Disposition:  Discharge  12/7/2024  1:04 pm    Follow-up:  No  follow-up provider specified.        Medications Prescribed:  Discharge Medication List as of 12/7/2024  1:09 PM              Supplementary Documentation:

## 2024-12-07 NOTE — ED INITIAL ASSESSMENT (HPI)
Pt with worsening cough x1 week. Pt diagnosed with pneumonia on Wednesday and started taking prescribed antibiotic that night. Pt also reports bilateral ear pain.

## 2025-01-02 ENCOUNTER — OFFICE VISIT (OUTPATIENT)
Dept: SURGERY | Facility: CLINIC | Age: 30
End: 2025-01-02
Payer: COMMERCIAL

## 2025-01-02 VITALS
WEIGHT: 293 LBS | SYSTOLIC BLOOD PRESSURE: 108 MMHG | DIASTOLIC BLOOD PRESSURE: 62 MMHG | HEIGHT: 67 IN | BODY MASS INDEX: 45.99 KG/M2 | HEART RATE: 97 BPM | OXYGEN SATURATION: 98 %

## 2025-01-02 DIAGNOSIS — R73.03 PRE-DIABETES: Primary | ICD-10-CM

## 2025-01-02 DIAGNOSIS — E66.01 MORBID OBESITY WITH BMI OF 45.0-49.9, ADULT (HCC): ICD-10-CM

## 2025-01-02 DIAGNOSIS — Z51.81 ENCOUNTER FOR THERAPEUTIC DRUG MONITORING: ICD-10-CM

## 2025-01-02 DIAGNOSIS — E61.1 IRON DEFICIENCY: ICD-10-CM

## 2025-01-02 PROCEDURE — 99214 OFFICE O/P EST MOD 30 MIN: CPT | Performed by: INTERNAL MEDICINE

## 2025-01-02 RX ORDER — PHENTERMINE HYDROCHLORIDE 15 MG/1
15 CAPSULE ORAL EVERY MORNING
Qty: 30 CAPSULE | Refills: 5 | Status: SHIPPED | OUTPATIENT
Start: 2025-01-02

## 2025-01-02 RX ORDER — SEMAGLUTIDE 0.5 MG/.5ML
0.5 INJECTION, SOLUTION SUBCUTANEOUS WEEKLY
COMMUNITY
Start: 2024-12-26 | End: 2025-01-02 | Stop reason: DRUGHIGH

## 2025-01-02 NOTE — PROGRESS NOTES
[facility], [facility], [place]  [address] [facility] [address] [place] [id number]  Dept: [phone]             Patient:  Dominique Low  :      3/18/1995  MRN:      [id number]    Chief Complaint:    Chief Complaint   Patient presents with    Follow - Up    Weight Management       SUBJECTIVE     History of Present Illness:  Dominique is being seen today for a follow-up for non surgical weight loss    Past Medical History:   Past Medical History:    Abnormal uterine bleeding    I went to ER for heavy blood which was not related to period    Allergic rhinitis    Anemia    Mild    Dysmenorrhea    always have really bad cramps.    Esophageal reflux    Gastritis    Morbid obesity with BMI of 45.0-49.9, adult (HCC)    Ovarian cyst    per pt h/x     Pre-diabetes        Comorbidities:  Back pain-Improvement?  yes, Joint pain-Improvement?  yes, and ERIKA-Improvement?  yes    OBJECTIVE     Vitals: /62   Pulse 97   Ht 5' 7\" (1.702 m)   Wt 297 lb 1.6 oz (134.8 kg)   LMP 2024 (Exact Date)   SpO2 98%   BMI 46.53 kg/m²     Initial weight loss: +05   Total weight loss: -10   Start weight: 307    Wt Readings from Last 3 Encounters:   25 297 lb 1.6 oz (134.8 kg)   10/02/24 292 lb (132.5 kg)   24 (!) 307 lb (139.3 kg)       Patient Medications:    Current Outpatient Medications   Medication Sig Dispense Refill    semaglutide-weight management 1 MG/0.5ML Subcutaneous Solution Auto-injector Inject 0.5 mL (1 mg total) into the skin once a week for 4 doses. 2 mL 5    Phentermine HCl 15 MG Oral Cap Take 1 capsule (15 mg total) by mouth every morning. 30 capsule 5    Cholecalciferol (VITAMIN D) 50 MCG (2000 UT) Oral Tab Take by mouth.      Ascorbic Acid (VITAMIN C) 100 MG Oral Tab Take 1 tablet (100 mg total) by mouth daily.      folic acid 1 MG Oral Tab Take by mouth daily.      Ferrous Gluconate 324 (37.5 Fe) MG Oral Tab Take 1 tablet (324 mg total) by mouth  daily. 90 tablet 3    tranexamic acid 650 MG Oral Tab Take 2 tablets (1,300 mg total) by mouth in the morning, at noon, and at bedtime. 90 tablet 3    Omeprazole Magnesium 20 MG Oral Tab EC Take 1 tablet (20 mg total) by mouth daily. 90 tablet 0    levocetirizine 5 MG Oral Tab Take 1 tablet (5 mg total) by mouth nightly. 90 tablet 0    triamcinolone acetonide 0.1 % External Ointment Applied 2 times per day as needed 60 g 0     Allergies:  Latex     Social History:    Social History     Socioeconomic History    Marital status:      Spouse name: Not on file    Number of children: Not on file    Years of education: Not on file    Highest education level: Not on file   Occupational History    Occupation:     Tobacco Use    Smoking status: Never    Smokeless tobacco: Never    Tobacco comments:     N/A   Vaping Use    Vaping status: Never Used   Substance and Sexual Activity    Alcohol use: Yes     Comment: occasionally    Drug use: Never    Sexual activity: Yes     Partners: Male   Other Topics Concern    Caffeine Concern No    Exercise No    Seat Belt No    Special Diet No    Stress Concern No    Weight Concern Yes     Comment: I am currently over weight, need to loose some weight     Service Not Asked    Blood Transfusions No    Occupational Exposure Not Asked    Hobby Hazards Not Asked    Sleep Concern Not Asked    Back Care Not Asked    Bike Helmet Not Asked    Self-Exams Not Asked   Social History Narrative    Not on file     Social Drivers of Health     Financial Resource Strain: Not on file   Food Insecurity: Not on file   Transportation Needs: Not on file   Physical Activity: Not on file   Stress: Not on file   Social Connections: Not on file   Housing Stability: Not on file     Surgical History:    Past Surgical History:   Procedure Laterality Date    Tonsillectomy       Family History:    Family History   Problem Relation Age of Onset    No Known Problems Father     No Known Problems  Mother     Other (Muscular dystophy) Sister     Diabetes Maternal Grandmother     Hypertension Maternal Grandmother     Cancer Paternal Grandmother         cancerous brain tumor    Other (brain cancer) Paternal Grandmother 68    Cancer Paternal Grandfather         Stomach cancer    Other (gastric cancer) Paternal Grandfather 60    Breast Cancer Neg     Ovarian Cancer Neg     Uterine Cancer Neg        Food Journal  Reviewed and Discussed:       Patient has a Food Journal?: yes   Patient is reading nutrition labels?  yes  Average Caloric Intake:     Average CHO Intake: 130  Is patient exercising? yes  Type of exercise? 3 miles on bike  Weights  Push ups  walking    Eating Habits  Patient states the following:  Eats 3 meal(s) per day  Length of time it takes to consume a meal:  20  # of snacks per day: 1 Type of snacks:  fruit  Amount of soda consumption per day:    Amount of water (in ounces) per day:  64  Drinking between meals only:  yes  Toughest challenge:      Nutritional Goals  Limit carbohydrates to 100 gms per day, Eat 100-200 calories within 1 hour of waking , and Eat 3-4 cups of fresh fruits or vegetables daily    Behavior Modifications Reviewed and Discussed  Eat breakfast, Eat 3 meals per day, Plan meals in advance, Read nutrition labels, Drink 64 oz of water per day, Maintain a daily food journal, No drinking 30 minutes before or after meals, Utlize portion control strategies to reduce calorie intake, Identify triggers for eating and manage cues, and Eat slowly and take 20 to 30 minutes to complete each meal    Exercise Goals Reviewed and Discussed    Continue to increase as tolerated    ROS:    Constitutional: negative  Respiratory: negative  Cardiovascular: negative  Gastrointestinal: negative  Musculoskeletal:negative  Neurological: negative  Behavioral/Psych: negative  Endocrine: negative  All other systems were reviewed and are negative    Physical Exam:     General appearance: alert, appears stated  age, cooperative, and morbidly obese  Head: Normocephalic, without obvious abnormality, atraumatic  Back: symmetric, no curvature. ROM normal. No CVA tenderness.  Lungs: clear to auscultation bilaterally  Abdomen: soft, obese, non tender  Extremities: extremities normal, atraumatic, no cyanosis or edema  Pulses: 2+ and symmetric  Skin: Skin color, texture, turgor normal. No rashes or lesions  Neurologic: Grossly normal      ASSESSMENT       Encounter Diagnosis(ses):   Encounter Diagnoses   Name Primary?    Pre-diabetes Yes    Iron deficiency     Encounter for therapeutic drug monitoring     Morbid obesity with BMI of 45.0-49.9, adult (HCC)        PLAN     Patient is not interested in bariatric surgery. Patient desires to pursue traditional weight loss at this time.      Pre diabetes: may improve with diet and exercise    Goals for next month:  1. Keep a food log.  2. Drink 48-64 ounces of non-caloric beverages per day. No fruit juices or regular soda.  3. Increase activity-upper body exercises, walk 10 minutes per day.  4. Increase fruit and vegetable servings to 5-6 per day.      Tolerating Wegovy  Increase to 1 mg    PHENTERMINE: Since the patient would like to try phentermine, and is aware of the potential side effects (hypertension, palpitations, tachycardia, and anxiety), I will give Dominique Low a prescription today to be used in conjunction with the above diet and exercise program. The patient will check her heart rate and blood pressure on a regular basis. She will call me if her BP goes over 140/90 or if she has palpitations or racing heart rate. She understands that I will not call in the prescription for her; she has to have an appointment to have the medication refilled.   Will start at 15 mg  Needs ekg    Has good motivation    Feels better    Diagnoses and all orders for this visit:    Pre-diabetes  -     EKG 12 Lead to be performed at [facility]; Future    Iron deficiency  -      EKG 12 Lead to be performed at [facility]; Future    Encounter for therapeutic drug monitoring  -     EKG 12 Lead to be performed at [facility]; Future    Morbid obesity with BMI of 45.0-49.9, adult (HCC)  -     semaglutide-weight management 1 MG/0.5ML Subcutaneous Solution Auto-injector; Inject 0.5 mL (1 mg total) into the skin once a week for 4 doses.  -     Phentermine HCl 15 MG Oral Cap; Take 1 capsule (15 mg total) by mouth every morning.  -     EKG 12 Lead to be performed at [facility]; Future          Omi Russ MD

## 2025-01-04 ENCOUNTER — EKG ENCOUNTER (OUTPATIENT)
Dept: LAB | Age: 30
End: 2025-01-04
Attending: INTERNAL MEDICINE
Payer: COMMERCIAL

## 2025-01-04 DIAGNOSIS — E61.1 IRON DEFICIENCY: ICD-10-CM

## 2025-01-04 DIAGNOSIS — E66.01 MORBID OBESITY WITH BMI OF 45.0-49.9, ADULT (HCC): ICD-10-CM

## 2025-01-04 DIAGNOSIS — R73.03 PRE-DIABETES: ICD-10-CM

## 2025-01-04 DIAGNOSIS — Z51.81 ENCOUNTER FOR THERAPEUTIC DRUG MONITORING: ICD-10-CM

## 2025-01-04 PROCEDURE — 93005 ELECTROCARDIOGRAM TRACING: CPT

## 2025-01-04 PROCEDURE — 93010 ELECTROCARDIOGRAM REPORT: CPT | Performed by: STUDENT IN AN ORGANIZED HEALTH CARE EDUCATION/TRAINING PROGRAM

## 2025-01-06 LAB
ATRIAL RATE: 77 BPM
P AXIS: 35 DEGREES
P-R INTERVAL: 146 MS
Q-T INTERVAL: 358 MS
QRS DURATION: 78 MS
QTC CALCULATION (BEZET): 405 MS
R AXIS: 23 DEGREES
T AXIS: 35 DEGREES
VENTRICULAR RATE: 77 BPM

## 2025-01-13 DIAGNOSIS — N92.0 MENORRHAGIA WITH REGULAR CYCLE: ICD-10-CM

## 2025-01-14 RX ORDER — TRANEXAMIC ACID 650 MG/1
1300 TABLET ORAL 3 TIMES DAILY
Qty: 90 TABLET | Refills: 3 | OUTPATIENT
Start: 2025-01-14

## 2025-03-16 ENCOUNTER — PATIENT MESSAGE (OUTPATIENT)
Dept: OBGYN CLINIC | Facility: CLINIC | Age: 30
End: 2025-03-16

## 2025-03-25 ENCOUNTER — LAB ENCOUNTER (OUTPATIENT)
Dept: LAB | Facility: HOSPITAL | Age: 30
End: 2025-03-25
Attending: OBSTETRICS & GYNECOLOGY
Payer: COMMERCIAL

## 2025-03-25 ENCOUNTER — OFFICE VISIT (OUTPATIENT)
Dept: OBGYN CLINIC | Facility: CLINIC | Age: 30
End: 2025-03-25
Payer: COMMERCIAL

## 2025-03-25 VITALS
SYSTOLIC BLOOD PRESSURE: 116 MMHG | DIASTOLIC BLOOD PRESSURE: 76 MMHG | HEIGHT: 67 IN | WEIGHT: 293 LBS | BODY MASS INDEX: 45.99 KG/M2

## 2025-03-25 DIAGNOSIS — R73.03 PREDIABETES: ICD-10-CM

## 2025-03-25 DIAGNOSIS — O99.210 OBESITY AFFECTING PREGNANCY, ANTEPARTUM, UNSPECIFIED OBESITY TYPE (HCC): ICD-10-CM

## 2025-03-25 DIAGNOSIS — N92.6 MISSED MENSES: Primary | ICD-10-CM

## 2025-03-25 DIAGNOSIS — Z32.01 PREGNANCY EXAMINATION OR TEST, POSITIVE RESULT (HCC): ICD-10-CM

## 2025-03-25 DIAGNOSIS — N92.6 MISSED MENSES: ICD-10-CM

## 2025-03-25 LAB
B-HCG SERPL-ACNC: 1808.2 MIU/ML
CONTROL LINE PRESENT WITH A CLEAR BACKGROUND (YES/NO): YES YES/NO
EST. AVERAGE GLUCOSE BLD GHB EST-MCNC: 114 MG/DL (ref 68–126)
HBA1C MFR BLD: 5.6 % (ref ?–5.7)
KIT LOT #: NORMAL NUMERIC
PREGNANCY TEST, URINE: POSITIVE

## 2025-03-25 PROCEDURE — 87086 URINE CULTURE/COLONY COUNT: CPT

## 2025-03-25 PROCEDURE — 81025 URINE PREGNANCY TEST: CPT | Performed by: OBSTETRICS & GYNECOLOGY

## 2025-03-25 PROCEDURE — 83036 HEMOGLOBIN GLYCOSYLATED A1C: CPT

## 2025-03-25 PROCEDURE — 87591 N.GONORRHOEAE DNA AMP PROB: CPT | Performed by: OBSTETRICS & GYNECOLOGY

## 2025-03-25 PROCEDURE — 84702 CHORIONIC GONADOTROPIN TEST: CPT

## 2025-03-25 PROCEDURE — 36415 COLL VENOUS BLD VENIPUNCTURE: CPT

## 2025-03-25 PROCEDURE — 99213 OFFICE O/P EST LOW 20 MIN: CPT | Performed by: OBSTETRICS & GYNECOLOGY

## 2025-03-25 PROCEDURE — 87491 CHLMYD TRACH DNA AMP PROBE: CPT | Performed by: OBSTETRICS & GYNECOLOGY

## 2025-03-25 NOTE — PROGRESS NOTES
[facility]  Obstetrics and Gynecology    Subjective:     Dominique Low is a 30 year old ,female, Patient's last menstrual period was 2025 (exact date). presents with missed menses and positive pregnancy test.     This is a unplanned pregnancy. Partner is involved.  Recent contraception: none  Patient's last menstrual period was 2025 (exact date). Lmp certain, regular  EDC 25, GA 5/6    Menarche: 8 (3/25/2025  9:13 AM)  Period Cycle (Days): 28 (3/25/2025  9:13 AM)  Period Duration (Days): 6-7 days (3/25/2025  9:13 AM)  Period Flow: moderate (3/25/2025  9:13 AM)  Use of Birth Control (if yes, specify type): None (3/25/2025  9:13 AM)  Hx Prior Abnormal Pap: No (3/25/2025  9:13 AM)  Pap Date: 22 (3/25/2025  9:13 AM)  Pap Result Notes: normal (3/25/2025  9:13 AM)      Nausea/vomiting - nause  Breast tenderness - Positive  Vaginal discharge - Negative  Vaginal bleeding - Negative  Urinary symptoms - Negative  Taking prenatal vitamins  Feels safe at home   STD hx:     Review of Systems:  General: no complaints  Eyes: no complaints  Respiratory: no complaints  Cardiovascular: no complaints  GI: no complaints  : no complaints See HPI  Hematological/lymphatic: no complaints  Breast: no complaints  Psychiatric: no complaints  Endocrine:no complaints  Neurological: no complaints  Immunological: no complaints  Musculoskeletal:no complaints    OB History    Para Term  AB Living   1 0 0 0 0 0   SAB IAB Ectopic Multiple Live Births   0 0 0 0 0       Past Medical History:    Abnormal uterine bleeding    I went to ER for heavy blood which was not related to period    Allergic rhinitis    Anemia    Mild    Dysmenorrhea    always have really bad cramps.    Esophageal reflux    Gastritis    Morbid obesity with BMI of 45.0-49.9, adult (HCC)    Ovarian cyst    per pt h/x     Pre-diabetes       Past Surgical History:   Procedure Laterality Date    Tonsillectomy          Social History     Socioeconomic History    Marital status:    Occupational History    Occupation:     Tobacco Use    Smoking status: Never    Smokeless tobacco: Never    Tobacco comments:     N/A   Vaping Use    Vaping status: Never Used   Substance and Sexual Activity    Alcohol use: Not Currently     Comment: occasionally    Drug use: Never    Sexual activity: Yes     Partners: Male   Other Topics Concern    Caffeine Concern No    Exercise No    Seat Belt No    Special Diet No    Stress Concern No    Weight Concern Yes     Comment: I am currently over weight, need to loose some weight    Blood Transfusions No       Family History   Problem Relation Age of Onset    No Known Problems Father     No Known Problems Mother     Other (Muscular dystophy) Sister     Diabetes Maternal Grandmother     Hypertension Maternal Grandmother     Cancer Paternal Grandmother         cancerous brain tumor    Other (brain cancer) Paternal Grandmother 68    Cancer Paternal Grandfather         Stomach cancer    Other (gastric cancer) Paternal Grandfather 60    Breast Cancer Neg     Ovarian Cancer Neg     Uterine Cancer Neg          Current Outpatient Medications:     Prenatal Multivit-Min-Fe-FA (PRENATAL OR), Take by mouth., Disp: , Rfl:     Ferrous Gluconate 324 (37.5 Fe) MG Oral Tab, Take 1 tablet (324 mg total) by mouth daily., Disp: 90 tablet, Rfl: 3    Phentermine HCl 15 MG Oral Cap, Take 1 capsule (15 mg total) by mouth every morning., Disp: 30 capsule, Rfl: 5    Cholecalciferol (VITAMIN D) 50 MCG (2000 UT) Oral Tab, Take by mouth. (Patient not taking: Reported on 3/25/2025), Disp: , Rfl:     Ascorbic Acid (VITAMIN C) 100 MG Oral Tab, Take 1 tablet (100 mg total) by mouth daily. (Patient not taking: Reported on 3/25/2025), Disp: , Rfl:     folic acid 1 MG Oral Tab, Take by mouth daily. (Patient not taking: Reported on 3/25/2025), Disp: , Rfl:     tranexamic acid 650 MG Oral Tab, Take 2 tablets (1,300 mg  total) by mouth in the morning, at noon, and at bedtime. (Patient not taking: Reported on 3/25/2025), Disp: 90 tablet, Rfl: 3    Omeprazole Magnesium 20 MG Oral Tab EC, Take 1 tablet (20 mg total) by mouth daily. (Patient not taking: Reported on 3/25/2025), Disp: 90 tablet, Rfl: 0    levocetirizine 5 MG Oral Tab, Take 1 tablet (5 mg total) by mouth nightly. (Patient not taking: Reported on 3/25/2025), Disp: 90 tablet, Rfl: 0    triamcinolone acetonide 0.1 % External Ointment, Applied 2 times per day as needed (Patient not taking: Reported on 3/25/2025), Disp: 60 g, Rfl: 0      Health maintenance:  Health Maintenance   Topic Date Due    COVID-19 Vaccine (4 - 2024-25 season) 09/01/2024    Influenza Vaccine (1) 10/01/2024    Pap Smear  08/27/2025    Annual Physical  07/06/2025    DTaP,Tdap,and Td Vaccines (6 - Td or Tdap) 09/25/2033    Annual Depression Screening  Completed    Pneumococcal Vaccine: Birth to 50yrs  Aged Out    Meningococcal B Vaccine  Aged Out        Objective:     Vitals:    03/25/25 0915   BP: 116/76   Weight: (!) 305 lb 9.6 oz (138.6 kg)   Height: 67\"         Body mass index is 47.86 kg/m².    GENERAL: well developed, well nourished, in no apparent distress, alert and orientated X 3  PSYCH: mood and affect stable   SKIN: no rashes, no lesions  HEENT: normal  LUNGS: respiration unlabored  CARDIOVASCULAR: no peripheral edema or varicosities, skin warm and dry      ABDOMEN: Soft, non distended; non tender, no masses  GYNE/:   External Genitalia: normal, no lesions, good perineal support  Urethra: meatus normal   Bladder: well supported  Vagina: normal mucosa, no lesions,  discharge   Uterus: normal size, mobile, nontender  Cervix: normal os, no lesions or bleeding  Adnexa:normal size, bilaterally nontender, no palpable masses  Cul-de-sac: normal  R/V: normal perineum, no hemorrhoids  EXTREMITIES:  Normal range of motion, strength 5/5, nontender without edema    Labs:  POC urine pregnancy test :  Positive     Imaging:  ordered       Assessment:     Dominique Low is a 30 year old  female who presents for missed menses and positive pregnancy test    Patient Active Problem List   Diagnosis    Morbid obesity with BMI of 45.0-49.9, adult (HCC)    Mild anemia    Menorrhagia with regular cycle    Iron deficiency    Pre-diabetes    Vitamin D deficiency    Encounter for therapeutic drug monitoring         Plan:       ICD-10-CM    1. Missed menses  N92.6 Urine Pregnancy Test          Patient's last menstrual period was 2025 (exact date). at 5/6weeks, EDC 25 by last menstrual period    Missed menses  - positive pregnancy test  - US ordered   - Pt counseled on safety, diet, exercise, caffiene, tobacco, ETOH, sexual activity, ER precautions, diet, exercise, appropriate otc medication use, substance abuse   - Counseled on taking a PNV with folic acid   - genetic screening testing d/w patient and family, pt declines invasive diagnostic testing and considering cell free DNA. Discussed possible out of pocket cost of 300$.    - considering carrier screening for CF, SMA, and hemoglobinopathies   - advised to follow up to establish prenatal care - referred for RN education visit  - SAB precautions provided     Obesity in pregnancy, prediabetes  -needs early glucola  -ASA 12 weeks  -level 2 ultrasound with MFM      All of the findings and plan were discussed with the patient.  She notes understanding and agrees with the plan of care.  All questions were answered to the best of my ability at this time.    RTC in 1-2 weeks for ultrasound, 5 weeks for RN education visit 7 wks for NOB visit, sooner if needed     MD KAREN Noel

## 2025-03-26 LAB
C TRACH DNA SPEC QL NAA+PROBE: NEGATIVE
N GONORRHOEA DNA SPEC QL NAA+PROBE: NEGATIVE

## 2025-03-28 ENCOUNTER — LAB ENCOUNTER (OUTPATIENT)
Dept: LAB | Age: 30
End: 2025-03-28
Attending: FAMILY MEDICINE
Payer: COMMERCIAL

## 2025-03-28 DIAGNOSIS — Z32.01 PREGNANCY EXAMINATION OR TEST, POSITIVE RESULT (HCC): ICD-10-CM

## 2025-03-28 LAB — B-HCG SERPL-ACNC: 2101.3 MIU/ML

## 2025-03-28 PROCEDURE — 36415 COLL VENOUS BLD VENIPUNCTURE: CPT

## 2025-03-28 PROCEDURE — 84702 CHORIONIC GONADOTROPIN TEST: CPT

## 2025-03-29 ENCOUNTER — PATIENT MESSAGE (OUTPATIENT)
Dept: OBGYN CLINIC | Facility: CLINIC | Age: 30
End: 2025-03-29

## 2025-03-29 DIAGNOSIS — Z32.01 PREGNANCY EXAMINATION OR TEST, POSITIVE RESULT (HCC): Primary | ICD-10-CM

## 2025-03-31 NOTE — TELEPHONE ENCOUNTER
Hi Dominique,  Your pregnancy hormone levels are increasing, but not as well as they should be for a normal pregnancy. Keep your scheduled ultrasound appointment for now. Let me know if you have any bleeding or pain and I will order a sooner ultrasound for you. Let me know if you have questions.  Dr. Anthony   Written by Patricia Anthony MD on 3/29/2025 11:14 AM CDT  Seen by patient Dominique Low on 3/30/2025  7:04 AM

## 2025-04-05 ENCOUNTER — MOBILE ENCOUNTER (OUTPATIENT)
Dept: OBGYN CLINIC | Facility: CLINIC | Age: 30
End: 2025-04-05

## 2025-04-05 NOTE — PROGRESS NOTES
Patient calling with spotting at 7wga. Also with bloating and low back pressure like before a period. No uti symptoms or unusual discharge. Miscarriage precautions discussed, patient reassured. Follow up as scheduled for US on 4/11.

## 2025-04-09 ENCOUNTER — HOSPITAL ENCOUNTER (OUTPATIENT)
Dept: ULTRASOUND IMAGING | Facility: HOSPITAL | Age: 30
Discharge: HOME | End: 2025-04-09
Attending: OBSTETRICS & GYNECOLOGY
Payer: COMMERCIAL

## 2025-04-09 ENCOUNTER — PATIENT MESSAGE (OUTPATIENT)
Dept: OBGYN CLINIC | Facility: CLINIC | Age: 30
End: 2025-04-09

## 2025-04-09 DIAGNOSIS — O26.859 SPOTTING IN PREGNANCY (HCC): ICD-10-CM

## 2025-04-09 DIAGNOSIS — O26.859 SPOTTING IN PREGNANCY (HCC): Primary | ICD-10-CM

## 2025-04-09 PROCEDURE — 76817 TRANSVAGINAL US OBSTETRIC: CPT | Performed by: OBSTETRICS & GYNECOLOGY

## 2025-04-09 PROCEDURE — 76801 OB US < 14 WKS SINGLE FETUS: CPT | Performed by: OBSTETRICS & GYNECOLOGY

## 2025-04-09 PROCEDURE — 93975 VASCULAR STUDY: CPT | Performed by: OBSTETRICS & GYNECOLOGY

## 2025-04-09 NOTE — TELEPHONE ENCOUNTER
RN consulted Dr. Anthony to schedule STAT ultrasound instead of previous scheduled ultrasound appt for 4/11. RN called patient and informed of recommendations and orders. Patient verbalized understanding and is agreeable to plan. Patient to schedule ultrasound appt for later today or tomorrow.

## 2025-04-09 NOTE — TELEPHONE ENCOUNTER
RN called patient. Patient stated that she was experiencing spotting since Saturday. Noticed spotting only with wiping. Spotting has changed from light pink to a darker red. Denies clots, cramping, saturating a pad hourly, feeling dizzy, lightheaded or faint. Reports feeling bloated and lower back pressure pain. Overall feels as if she likes the beginning days of her period. RN informed patient that RN will consult provider. Patient verbalized understanding

## 2025-04-10 ENCOUNTER — ANESTHESIA EVENT (OUTPATIENT)
Dept: SURGERY | Facility: HOSPITAL | Age: 30
End: 2025-04-10
Payer: COMMERCIAL

## 2025-04-10 ENCOUNTER — APPOINTMENT (OUTPATIENT)
Dept: ULTRASOUND IMAGING | Facility: HOSPITAL | Age: 30
End: 2025-04-10
Attending: EMERGENCY MEDICINE
Payer: COMMERCIAL

## 2025-04-10 ENCOUNTER — ANESTHESIA (OUTPATIENT)
Dept: SURGERY | Facility: HOSPITAL | Age: 30
End: 2025-04-10
Payer: COMMERCIAL

## 2025-04-10 ENCOUNTER — HOSPITAL ENCOUNTER (EMERGENCY)
Facility: HOSPITAL | Age: 30
Discharge: HOME | End: 2025-04-10
Attending: EMERGENCY MEDICINE
Payer: COMMERCIAL

## 2025-04-10 VITALS
TEMPERATURE: 98 F | HEART RATE: 85 BPM | SYSTOLIC BLOOD PRESSURE: 128 MMHG | RESPIRATION RATE: 16 BRPM | DIASTOLIC BLOOD PRESSURE: 71 MMHG | OXYGEN SATURATION: 99 %

## 2025-04-10 DIAGNOSIS — Z98.890 STATUS POST SURGERY: Primary | ICD-10-CM

## 2025-04-10 DIAGNOSIS — O03.9 MISCARRIAGE (HCC): ICD-10-CM

## 2025-04-10 PROBLEM — O02.1 MISSED ABORTION (HCC): Status: ACTIVE | Noted: 2025-04-10

## 2025-04-10 LAB
ALBUMIN SERPL-MCNC: 4.5 G/DL (ref 3.2–4.8)
ALBUMIN/GLOB SERPL: 1.9 {RATIO} (ref 1–2)
ALP LIVER SERPL-CCNC: 52 U/L (ref 37–98)
ALT SERPL-CCNC: 28 U/L (ref 10–49)
ANION GAP SERPL CALC-SCNC: 9 MMOL/L (ref 0–18)
AST SERPL-CCNC: 20 U/L (ref ?–34)
B-HCG SERPL-ACNC: 1939.7 MIU/ML (ref ?–4.2)
BASOPHILS # BLD AUTO: 0.02 X10(3) UL (ref 0–0.2)
BASOPHILS NFR BLD AUTO: 0.3 %
BILIRUB SERPL-MCNC: 0.3 MG/DL (ref 0.3–1.2)
BUN BLD-MCNC: 9 MG/DL (ref 9–23)
BUN/CREAT SERPL: 15.8 (ref 10–20)
CALCIUM BLD-MCNC: 9.2 MG/DL (ref 8.7–10.4)
CHLORIDE SERPL-SCNC: 106 MMOL/L (ref 98–112)
CO2 SERPL-SCNC: 25 MMOL/L (ref 21–32)
CREAT BLD-MCNC: 0.57 MG/DL (ref 0.55–1.02)
DEPRECATED RDW RBC AUTO: 41.8 FL (ref 35.1–46.3)
EGFRCR SERPLBLD CKD-EPI 2021: 124 ML/MIN/1.73M2 (ref 60–?)
EOSINOPHIL # BLD AUTO: 0.08 X10(3) UL (ref 0–0.7)
EOSINOPHIL NFR BLD AUTO: 1.1 %
ERYTHROCYTE [DISTWIDTH] IN BLOOD BY AUTOMATED COUNT: 13.4 % (ref 11–15)
GLOBULIN PLAS-MCNC: 2.4 G/DL (ref 2–3.5)
GLUCOSE BLD-MCNC: 103 MG/DL (ref 70–99)
HCT VFR BLD AUTO: 35.3 % (ref 35–48)
HGB BLD-MCNC: 11.8 G/DL (ref 12–16)
IMM GRANULOCYTES # BLD AUTO: 0.02 X10(3) UL (ref 0–1)
IMM GRANULOCYTES NFR BLD: 0.3 %
LYMPHOCYTES # BLD AUTO: 1.95 X10(3) UL (ref 1–4)
LYMPHOCYTES NFR BLD AUTO: 27 %
MCH RBC QN AUTO: 28.3 PG (ref 26–34)
MCHC RBC AUTO-ENTMCNC: 33.4 G/DL (ref 31–37)
MCV RBC AUTO: 84.7 FL (ref 80–100)
MONOCYTES # BLD AUTO: 0.41 X10(3) UL (ref 0.1–1)
MONOCYTES NFR BLD AUTO: 5.7 %
NEUTROPHILS # BLD AUTO: 4.75 X10 (3) UL (ref 1.5–7.7)
NEUTROPHILS # BLD AUTO: 4.75 X10(3) UL (ref 1.5–7.7)
NEUTROPHILS NFR BLD AUTO: 65.6 %
OSMOLALITY SERPL CALC.SUM OF ELEC: 289 MOSM/KG (ref 275–295)
PLATELET # BLD AUTO: 228 10(3)UL (ref 150–450)
POTASSIUM SERPL-SCNC: 3.8 MMOL/L (ref 3.5–5.1)
PROT SERPL-MCNC: 6.9 G/DL (ref 5.7–8.2)
RBC # BLD AUTO: 4.17 X10(6)UL (ref 3.8–5.3)
RH BLOOD TYPE: POSITIVE
SODIUM SERPL-SCNC: 140 MMOL/L (ref 136–145)
WBC # BLD AUTO: 7.2 X10(3) UL (ref 4–11)

## 2025-04-10 PROCEDURE — 88305 TISSUE EXAM BY PATHOLOGIST: CPT | Performed by: OBSTETRICS & GYNECOLOGY

## 2025-04-10 PROCEDURE — 85025 COMPLETE CBC W/AUTO DIFF WBC: CPT | Performed by: EMERGENCY MEDICINE

## 2025-04-10 PROCEDURE — 76817 TRANSVAGINAL US OBSTETRIC: CPT | Performed by: EMERGENCY MEDICINE

## 2025-04-10 PROCEDURE — 86901 BLOOD TYPING SEROLOGIC RH(D): CPT | Performed by: EMERGENCY MEDICINE

## 2025-04-10 PROCEDURE — 99285 EMERGENCY DEPT VISIT HI MDM: CPT

## 2025-04-10 PROCEDURE — 10D17ZZ EXTRACTION OF PRODUCTS OF CONCEPTION, RETAINED, VIA NATURAL OR ARTIFICIAL OPENING: ICD-10-PCS | Performed by: OBSTETRICS & GYNECOLOGY

## 2025-04-10 PROCEDURE — 86900 BLOOD TYPING SEROLOGIC ABO: CPT | Performed by: EMERGENCY MEDICINE

## 2025-04-10 PROCEDURE — 80053 COMPREHEN METABOLIC PANEL: CPT | Performed by: EMERGENCY MEDICINE

## 2025-04-10 PROCEDURE — 36415 COLL VENOUS BLD VENIPUNCTURE: CPT

## 2025-04-10 PROCEDURE — 84702 CHORIONIC GONADOTROPIN TEST: CPT | Performed by: EMERGENCY MEDICINE

## 2025-04-10 RX ORDER — ONDANSETRON 2 MG/ML
INJECTION INTRAMUSCULAR; INTRAVENOUS AS NEEDED
Status: DISCONTINUED | OUTPATIENT
Start: 2025-04-10 | End: 2025-04-10 | Stop reason: SURG

## 2025-04-10 RX ORDER — ACETAMINOPHEN 500 MG
1000 TABLET ORAL ONCE
Status: COMPLETED | OUTPATIENT
Start: 2025-04-10 | End: 2025-04-10

## 2025-04-10 RX ORDER — LIDOCAINE HYDROCHLORIDE 10 MG/ML
INJECTION, SOLUTION EPIDURAL; INFILTRATION; INTRACAUDAL; PERINEURAL AS NEEDED
Status: DISCONTINUED | OUTPATIENT
Start: 2025-04-10 | End: 2025-04-10 | Stop reason: SURG

## 2025-04-10 RX ORDER — MORPHINE SULFATE 4 MG/ML
4 INJECTION, SOLUTION INTRAMUSCULAR; INTRAVENOUS EVERY 10 MIN PRN
Status: DISCONTINUED | OUTPATIENT
Start: 2025-04-10 | End: 2025-04-10

## 2025-04-10 RX ORDER — SODIUM CHLORIDE, SODIUM LACTATE, POTASSIUM CHLORIDE, CALCIUM CHLORIDE 600; 310; 30; 20 MG/100ML; MG/100ML; MG/100ML; MG/100ML
INJECTION, SOLUTION INTRAVENOUS CONTINUOUS
Status: DISCONTINUED | OUTPATIENT
Start: 2025-04-10 | End: 2025-04-10

## 2025-04-10 RX ORDER — ACETAMINOPHEN 325 MG/1
325 TABLET ORAL EVERY 6 HOURS PRN
Qty: 60 TABLET | Refills: 0 | Status: SHIPPED | OUTPATIENT
Start: 2025-04-10

## 2025-04-10 RX ORDER — MORPHINE SULFATE 4 MG/ML
2 INJECTION, SOLUTION INTRAMUSCULAR; INTRAVENOUS EVERY 10 MIN PRN
Status: DISCONTINUED | OUTPATIENT
Start: 2025-04-10 | End: 2025-04-10

## 2025-04-10 RX ORDER — HYDROMORPHONE HYDROCHLORIDE 1 MG/ML
0.6 INJECTION, SOLUTION INTRAMUSCULAR; INTRAVENOUS; SUBCUTANEOUS EVERY 5 MIN PRN
Status: DISCONTINUED | OUTPATIENT
Start: 2025-04-10 | End: 2025-04-10

## 2025-04-10 RX ORDER — HYDROMORPHONE HYDROCHLORIDE 1 MG/ML
0.4 INJECTION, SOLUTION INTRAMUSCULAR; INTRAVENOUS; SUBCUTANEOUS EVERY 5 MIN PRN
Status: DISCONTINUED | OUTPATIENT
Start: 2025-04-10 | End: 2025-04-10

## 2025-04-10 RX ORDER — ONDANSETRON 4 MG/1
4 TABLET, FILM COATED ORAL EVERY 8 HOURS PRN
OUTPATIENT
Start: 2025-04-10

## 2025-04-10 RX ORDER — HYDROCODONE BITARTRATE AND ACETAMINOPHEN 5; 325 MG/1; MG/1
1 TABLET ORAL EVERY 4 HOURS PRN
Qty: 10 TABLET | Refills: 0 | Status: SHIPPED | OUTPATIENT
Start: 2025-04-10

## 2025-04-10 RX ORDER — DEXAMETHASONE SODIUM PHOSPHATE 4 MG/ML
VIAL (ML) INJECTION AS NEEDED
Status: DISCONTINUED | OUTPATIENT
Start: 2025-04-10 | End: 2025-04-10 | Stop reason: SURG

## 2025-04-10 RX ORDER — HYDROCODONE BITARTRATE AND ACETAMINOPHEN 10; 325 MG/1; MG/1
1 TABLET ORAL EVERY 4 HOURS PRN
Status: DISCONTINUED | OUTPATIENT
Start: 2025-04-10 | End: 2025-04-10

## 2025-04-10 RX ORDER — MORPHINE SULFATE 10 MG/ML
6 INJECTION, SOLUTION INTRAMUSCULAR; INTRAVENOUS EVERY 10 MIN PRN
Status: DISCONTINUED | OUTPATIENT
Start: 2025-04-10 | End: 2025-04-10

## 2025-04-10 RX ORDER — ONDANSETRON 2 MG/ML
4 INJECTION INTRAMUSCULAR; INTRAVENOUS EVERY 8 HOURS PRN
OUTPATIENT
Start: 2025-04-10

## 2025-04-10 RX ORDER — IBUPROFEN 600 MG/1
600 TABLET, FILM COATED ORAL EVERY 6 HOURS PRN
Qty: 60 TABLET | Refills: 0 | Status: SHIPPED | OUTPATIENT
Start: 2025-04-10

## 2025-04-10 RX ORDER — MIDAZOLAM HYDROCHLORIDE 1 MG/ML
INJECTION INTRAMUSCULAR; INTRAVENOUS AS NEEDED
Status: DISCONTINUED | OUTPATIENT
Start: 2025-04-10 | End: 2025-04-10 | Stop reason: SURG

## 2025-04-10 RX ORDER — NALOXONE HYDROCHLORIDE 0.4 MG/ML
80 INJECTION, SOLUTION INTRAMUSCULAR; INTRAVENOUS; SUBCUTANEOUS AS NEEDED
Status: DISCONTINUED | OUTPATIENT
Start: 2025-04-10 | End: 2025-04-10

## 2025-04-10 RX ORDER — KETOROLAC TROMETHAMINE 30 MG/ML
INJECTION, SOLUTION INTRAMUSCULAR; INTRAVENOUS AS NEEDED
Status: DISCONTINUED | OUTPATIENT
Start: 2025-04-10 | End: 2025-04-10 | Stop reason: SURG

## 2025-04-10 RX ORDER — HYDROMORPHONE HYDROCHLORIDE 1 MG/ML
0.2 INJECTION, SOLUTION INTRAMUSCULAR; INTRAVENOUS; SUBCUTANEOUS EVERY 5 MIN PRN
Status: DISCONTINUED | OUTPATIENT
Start: 2025-04-10 | End: 2025-04-10

## 2025-04-10 RX ORDER — HYDROCODONE BITARTRATE AND ACETAMINOPHEN 5; 325 MG/1; MG/1
1 TABLET ORAL EVERY 6 HOURS PRN
Status: DISCONTINUED | OUTPATIENT
Start: 2025-04-10 | End: 2025-04-10

## 2025-04-10 RX ORDER — ESMOLOL HYDROCHLORIDE 10 MG/ML
INJECTION INTRAVENOUS AS NEEDED
Status: DISCONTINUED | OUTPATIENT
Start: 2025-04-10 | End: 2025-04-10 | Stop reason: SURG

## 2025-04-10 RX ORDER — GLYCOPYRROLATE 0.2 MG/ML
INJECTION, SOLUTION INTRAMUSCULAR; INTRAVENOUS AS NEEDED
Status: DISCONTINUED | OUTPATIENT
Start: 2025-04-10 | End: 2025-04-10 | Stop reason: SURG

## 2025-04-10 RX ORDER — SODIUM CHLORIDE, SODIUM LACTATE, POTASSIUM CHLORIDE, CALCIUM CHLORIDE 600; 310; 30; 20 MG/100ML; MG/100ML; MG/100ML; MG/100ML
INJECTION, SOLUTION INTRAVENOUS CONTINUOUS PRN
Status: DISCONTINUED | OUTPATIENT
Start: 2025-04-10 | End: 2025-04-10 | Stop reason: SURG

## 2025-04-10 RX ORDER — DOXYCYCLINE 100 MG/10ML
INJECTION, POWDER, LYOPHILIZED, FOR SOLUTION INTRAVENOUS AS NEEDED
Status: DISCONTINUED | OUTPATIENT
Start: 2025-04-10 | End: 2025-04-10 | Stop reason: SURG

## 2025-04-10 RX ADMIN — DEXAMETHASONE SODIUM PHOSPHATE 8 MG: 4 MG/ML VIAL (ML) INJECTION at 16:06:00

## 2025-04-10 RX ADMIN — KETOROLAC TROMETHAMINE 30 MG: 30 INJECTION, SOLUTION INTRAMUSCULAR; INTRAVENOUS at 16:30:00

## 2025-04-10 RX ADMIN — SODIUM CHLORIDE, SODIUM LACTATE, POTASSIUM CHLORIDE, CALCIUM CHLORIDE: 600; 310; 30; 20 INJECTION, SOLUTION INTRAVENOUS at 16:03:00

## 2025-04-10 RX ADMIN — DOXYCYCLINE 100 MG: 100 INJECTION, POWDER, LYOPHILIZED, FOR SOLUTION INTRAVENOUS at 16:25:00

## 2025-04-10 RX ADMIN — ESMOLOL HYDROCHLORIDE 20 MG: 10 INJECTION INTRAVENOUS at 16:17:00

## 2025-04-10 RX ADMIN — MIDAZOLAM HYDROCHLORIDE 2 MG: 1 INJECTION INTRAMUSCULAR; INTRAVENOUS at 16:05:00

## 2025-04-10 RX ADMIN — GLYCOPYRROLATE 0.2 MG: 0.2 INJECTION, SOLUTION INTRAMUSCULAR; INTRAVENOUS at 16:06:00

## 2025-04-10 RX ADMIN — ESMOLOL HYDROCHLORIDE 30 MG: 10 INJECTION INTRAVENOUS at 16:08:00

## 2025-04-10 RX ADMIN — ONDANSETRON 4 MG: 2 INJECTION INTRAMUSCULAR; INTRAVENOUS at 16:06:00

## 2025-04-10 RX ADMIN — LIDOCAINE HYDROCHLORIDE 50 MG: 10 INJECTION, SOLUTION EPIDURAL; INFILTRATION; INTRACAUDAL; PERINEURAL at 16:06:00

## 2025-04-10 NOTE — ANESTHESIA PREPROCEDURE EVALUATION
Anesthesia PreOp Note    HPI:     Dominique Low is a 30 year old female who presents for preoperative consultation requested by: Tapan Lawler MD    Date of Surgery: 4/10/2025    Procedure(s):  DILATION & CURETTAGE SUCTION  Indication: missed     Relevant Problems   No relevant active problems       NPO:                         History Review:  Patient Active Problem List    Diagnosis Date Noted    Miscarriage (HCC) 04/10/2025    Encounter for therapeutic drug monitoring 10/02/2024    Pre-diabetes 2024    Vitamin D deficiency 2024    Iron deficiency 2024    Morbid obesity with BMI of 45.0-49.9, adult (HCC) 2023    Mild anemia 2023    Menorrhagia with regular cycle 2023       Past Medical History[1]    Past Surgical History[2]    Prescriptions Prior to Admission[3]  Current Medications and Prescriptions Ordered in Epic[4]    Allergies[5]    Family History[6]  Social Hx on file[7]    Available pre-op labs reviewed.  Lab Results   Component Value Date    WBC 7.2 04/10/2025    RBC 4.17 04/10/2025    HGB 11.8 (L) 04/10/2025    HCT 35.3 04/10/2025    MCV 84.7 04/10/2025    MCH 28.3 04/10/2025    MCHC 33.4 04/10/2025    RDW 13.4 04/10/2025    .0 04/10/2025    HCGQN 1,939.7 (H) 04/10/2025     Lab Results   Component Value Date     04/10/2025    K 3.8 04/10/2025     04/10/2025    CO2 25.0 04/10/2025    BUN 9 04/10/2025    CREATSERUM 0.57 04/10/2025     (H) 04/10/2025    CA 9.2 04/10/2025          Vital Signs:  There is no height or weight on file to calculate BMI.   oral temperature is 98.1 °F (36.7 °C). Her blood pressure is 111/70 and her pulse is 94. Her respiration is 18 and oxygen saturation is 98%.   Vitals:    04/10/25 1014 04/10/25 1300 04/10/25 1531   BP: 123/71 111/70    Pulse: 96 94    Resp: 18  18   Temp: 98.1 °F (36.7 °C)     TempSrc: Oral     SpO2: 98% 98%         Anesthesia Evaluation     Patient summary reviewed and Nursing notes  reviewed    No history of anesthetic complications   Airway   Mallampati: II  Neck ROM: full  Dental      Pulmonary - negative ROS and normal exam   Cardiovascular - negative ROS and normal exam    Neuro/Psych - negative ROS     GI/Hepatic/Renal - negative ROS     Endo/Other - negative ROS   Abdominal   (+) obese                 Anesthesia Plan:   ASA:  3  Plan:   General  Airway:  ETT  Post-op Pain Management: IV analgesics  Informed Consent Plan and Risks Discussed With:  Patient  Discussed plan with:  CRNA      I have informed Dominique Low and/or legal guardian or family member of the nature of the anesthetic plan, benefits, risks including possible dental damage if relevant, major complications, and any alternative forms of anesthetic management.   All of the patient's questions were answered to the best of my ability. The patient desires the anesthetic management as planned.  Cole Lewis MD  4/10/2025 3:47 PM  Present on Admission:  **None**           [1]   Past Medical History:   Abnormal uterine bleeding    I went to ER for heavy blood which was not related to period    Allergic rhinitis    Anemia    Mild    Dysmenorrhea    always have really bad cramps.    Esophageal reflux    Gastritis    Morbid obesity with BMI of 45.0-49.9, adult (HCC)    Ovarian cyst    per pt h/x     Pre-diabetes   [2]   Past Surgical History:  Procedure Laterality Date    Tonsillectomy     [3]   Medications Prior to Admission   Medication Sig Dispense Refill Last Dose/Taking    Prenatal Multivit-Min-Fe-FA (PRENATAL OR) Take 1 tablet by mouth daily.   4/10/2025 Morning    Ferrous Gluconate 324 (37.5 Fe) MG Oral Tab Take 1 tablet (324 mg total) by mouth daily. 90 tablet 3 4/10/2025 Morning    Omeprazole Magnesium 20 MG Oral Tab EC Take 1 tablet (20 mg total) by mouth daily. (Patient taking differently: Take 1 tablet (20 mg total) by mouth as needed (GERD).) 90 tablet 0 Taking Differently    levocetirizine 5 MG Oral Tab  Take 1 tablet (5 mg total) by mouth nightly. (Patient taking differently: Take 1 tablet (5 mg total) by mouth as needed (Rashes).) 90 tablet 0 Taking Differently    tranexamic acid 650 MG Oral Tab Take 2 tablets (1,300 mg total) by mouth in the morning, at noon, and at bedtime. (Patient not taking: Reported on 3/25/2025) 90 tablet 3    [4]   Current Facility-Administered Medications Ordered in Epic   Medication Dose Route Frequency Provider Last Rate Last Admin    doxycycline hyclate (Vibramycin) 200 mg in sodium chloride 0.9% 250 mL IVPB  200 mg Intravenous Once (Intra-Op) Tapan Lawler MD         No current [facility]-ordered outpatient medications on file.   [5]   Allergies  Allergen Reactions    Latex RASH   [6]   Family History  Problem Relation Age of Onset    No Known Problems Father     No Known Problems Mother     Other (Muscular dystophy) Sister     Diabetes Maternal Grandmother     Hypertension Maternal Grandmother     Cancer Paternal Grandmother         cancerous brain tumor    Other (brain cancer) Paternal Grandmother 68    Cancer Paternal Grandfather         Stomach cancer    Other (gastric cancer) Paternal Grandfather 60    Breast Cancer Neg     Ovarian Cancer Neg     Uterine Cancer Neg    [7]   Social History  Socioeconomic History    Marital status:    Occupational History    Occupation:     Tobacco Use    Smoking status: Never    Smokeless tobacco: Never    Tobacco comments:     N/A   Vaping Use    Vaping status: Never Used   Substance and Sexual Activity    Alcohol use: Not Currently     Comment: occasionally    Drug use: Never    Sexual activity: Yes     Partners: Male   Other Topics Concern    Caffeine Concern No    Exercise No    Seat Belt No    Special Diet No    Stress Concern No    Weight Concern Yes     Comment: I am currently over weight, need to loose some weight    Blood Transfusions No

## 2025-04-10 NOTE — OPERATIVE REPORT
OPERATIVE REPORT:   DILATATION AND SUCTION CURETTAGE PROCEDURE NOTE    PATIENT: Dominique Low   DATE OF PROCEDURE: 04/10/25     INDICATIONS:   Patient is a 30 year old  with missed /incomplete  who necessitated suction dilation and curettage for definitive management of missed .     PRE-OP DIAGNOSIS:   Missed      POST-OP DIAGNOSIS:   Same    PROCEDURE(S):   1) dilation and curettage with suction     ANESTHESIA: General    SURGEON(S): Dr. Tapan Lawler MD     ESTIMATED BLOOD LOSS: 10 mL           DRAINS: No urinary output collected.            SPECIMENS: 1) Products of conception sent for pathology and genetic evaluation.            IMPLANTS: None           COMPLICATIONS:  None    FINDINGS: Normal external genitalia, no lesions. Normal cervix, no lesions. Anteverted uterus. Cervix dilated to 8 mm. Moderate amount of products of conception.  Specimens sent to pathology. Good hemostasis.     PROCEDURE: This procedure was fully reviewed with the patient and written informed consent was obtained after discussing risks, benefits, indication and alternatives. All questions were answered.     The patient was taken to operative room and general anesthesia was initiated. She was placed in dorsal lithotomy position. She was prepped and draped in normal sterile fashion. The bladder was not emptied. A sterile bivalve speculum was placed in the vagina and the cervix was visualized. A single tooth tenaculum was used to grasp the anterior lip of the cervix. The cervix was gently dilated with hegar dilators to 8 mm. An 8 mm curved suction curette was then gently advanced into the uterine cavity and activated. Moderate amount of tissue was noted. This was repeated one more time with a 9 mm curette and noted decreasing to minimal tissue on this second pass. The procedure then concluded. The specimens were sent to pathology. The single tooth tenaculum was removed. Persistent bleeding  noted at tenaculum site and ring forceps were placed then removed. Good hemostasis was noted. All instruments were then removed from the vagina. Sponge, lap, needle, and instrument counts correct by two counts. The patient was taken to recovery room in stable condition.    DISPOSITION:  To recovery room in stable condition        CONDITION: Stable      Dr. Tapan Lawler MD    [facility]     This note was created by [employer] voice recognition. Errors in content may be related to improper recognition by the system; efforts to review and correct have been done but errors may still exist. Please be advised the primary purpose of this note is for me to communicate medical care. Standard sentence structure is not always used. Medical terminology and medical abbreviations may be used. There may be grammatical, typographical, and automated fill ins that may have errors missed in proofreading.

## 2025-04-10 NOTE — TELEPHONE ENCOUNTER
Called pt states she is having severe back pain and bleeding going to the ED.  Informed pt will relay to Dr. Anthony.  Pt agrees.    Informed provider on-call Dr. Lawler

## 2025-04-10 NOTE — H&P
[facility]  Obstetrics and Gynecology  History & Physical    CC: Patient is here for vaginal bleeding    SUBJECTIVE:    Dominique Low is a 30 year old  female who presents for vaginal bleeding 2/2 incomplete . The patient was seen in the ER with complaint of vaginal bleeding. Patient reported bleeding for the last 4 days. Pelvic US 2025 was performed and noted for viable IUP at 6w1d with low FHR at 100 BPM. Ultrasound today revealed no FHR and a smaller fetal pole.  A discussion was held with the patient regarding findings and recommendations. The patient was offered expectant, medical and surgical options. Patient elected for dilation and curettage with suction.     Obstetric History:   OB History    Para Term  AB Living   1 0 0 0 0 0   SAB IAB Ectopic Multiple Live Births   0 0 0 0 0      # Outcome Date GA Lbr Donovan/2nd Weight Sex Type Anes PTL Lv   1 Current              Past Medical History: Past Medical History[1]  Past Surgical History: Past Surgical History[2]  Family History: Family History[3]  Social History:   Social History     Tobacco Use    Smoking status: Never    Smokeless tobacco: Never    Tobacco comments:     N/A   Substance Use Topics    Alcohol use: Not Currently     Comment: occasionally       Home Meds: Prescriptions Prior to Admission[4]  Allergies: Allergies[5]    Review of Systems:  General: no complaints  Eyes: no complaints  Respiratory: no complaints  Cardiovascular: no complaints  GI: no complaints    : See HPI   Hematological/lymphatic: no complaints  Psychiatric: no complaints  Endocrine:no complaints  Neurological: no complaints  Immunological: no complaints  Musculoskeletal:no complaints    OBJECTIVE:    Vitals:    04/10/25 1300   BP: 111/70   Pulse: 94   Resp:    Temp:       There is no height or weight on file to calculate BMI.    Exam:   GENERAL: well developed, well nourished, in no apparent distress, alert and  orientated X 3  PSYCH: mood and affect stable   SKIN: no rashes, no lesions  LUNGS: respiration unlabored  CARDIOVASCULAR: no peripheral edema or varicosities, skin warm and dry  EXTREMITIES:  Normal range of motion, strength 5/5 walking.     Labs:  Recent Labs   Lab 04/10/25  1043   WBC 7.2   HGB 11.8*   .0   NE 4.75            Imaging:  Pelvic US 4/10/25:   \"FINDINGS:       There is a single intrauterine pregnancy with a crown-rump length of 3 mm, which corresponds to a gestational age of 5 weeks and 6 days.  Previously, on the prior ultrasound dated 2025 the crown-rump length measured 5 mm.  No fetal heart rate is   identified.  Previously on the prior ultrasound, the fetal heart rate measured up to 100 beats per minute.  There is a 4 mm yolk sac.      The cervix is closed.  The uterus appears grossly unchanged.  No free fluid in the pelvis.  The ovaries are not clearly identified. \"     ASSESSMENT/ PLAN:    Dominique Low is a 30 year old  female who presents for incomplete     Problem List[6]    Incomplete   -Will plan for dilation and curettage with suction.  - Admit for outpatient surgical procedure   - IVF: LR @ 100 cc/hr   - Diet: NPO  - Meds: Preoperative antibiotics indicated- 200 mg IV Doxycycline X 1 in the OR.   - VTE prophylaxis: SCDs   - Anesthesia to evaluate   - Written consent to be obtained in preop and placed in chart.       Risks, benefits, alternatives and possible complications have been discussed in detail with the patient.  Pre-admission, admission, and post admission procedures and expectations were discussed in detail.  All questions answered, all appropriate consents will be signed at the Hospital.     Dr. Tapan Lawler MD    [facility]     This note was created by [employer] voice recognition. Errors in content may be related to improper recognition by the system; efforts to review and correct have been done but errors may still exist. Please be  advised the primary purpose of this note is for me to communicate medical care. Standard sentence structure is not always used. Medical terminology and medical abbreviations may be used. There may be grammatical, typographical, and automated fill ins that may have errors missed in proofreading.           [1]   Past Medical History:   Abnormal uterine bleeding    I went to ER for heavy blood which was not related to period    Allergic rhinitis    Anemia    Mild    Dysmenorrhea    always have really bad cramps.    Esophageal reflux    Gastritis    Morbid obesity with BMI of 45.0-49.9, adult (HCC)    Ovarian cyst    per pt h/x     Pre-diabetes   [2]   Past Surgical History:  Procedure Laterality Date    Tonsillectomy     [3]   Family History  Problem Relation Age of Onset    No Known Problems Father     No Known Problems Mother     Other (Muscular dystophy) Sister     Diabetes Maternal Grandmother     Hypertension Maternal Grandmother     Cancer Paternal Grandmother         cancerous brain tumor    Other (brain cancer) Paternal Grandmother 68    Cancer Paternal Grandfather         Stomach cancer    Other (gastric cancer) Paternal Grandfather 60    Breast Cancer Neg     Ovarian Cancer Neg     Uterine Cancer Neg    [4] (Not in a hospital admission)  [5]   Allergies  Allergen Reactions    Latex RASH   [6]   Patient Active Problem List  Diagnosis    Morbid obesity with BMI of 45.0-49.9, adult (HCC)    Mild anemia    Menorrhagia with regular cycle    Iron deficiency    Pre-diabetes    Vitamin D deficiency    Encounter for therapeutic drug monitoring

## 2025-04-10 NOTE — ANESTHESIA PROCEDURE NOTES
Airway  Date/Time: 4/10/2025 4:11 PM  Reason: Elective      General Information and Staff   Patient location during procedure: OR  Anesthesiologist: Cole Lewis MD  Resident/CRNA: Vandana Morales CRNA  Performed: CRNA   Performed by: Vandana Morales CRNA  Authorized by: Cole Lewis MD        Indications and Patient Condition  Indications for airway management: anesthesia  Sedation level: deep      Preoxygenated: yesPatient position: sniffing    Mask difficulty assessment: 1 - vent by mask    Final Airway Details    Final airway type: endotracheal airway    Successful airway: ETT  Cuffed: yes   Successful intubation technique: direct laryngoscopy  Endotracheal tube insertion site: oral  Blade: [place]  Blade size: #2  ETT size (mm): 7.0    Cormack-Lehane Classification: grade I - full view of glottis  Placement verified by: capnometry   Measured from: lips  ETT to lips (cm): 23  Number of attempts at approach: 1    Additional Comments  Dentition as preop

## 2025-04-10 NOTE — DISCHARGE INSTRUCTIONS
Post Operative Home Care Instructions     We hope you were pleased with your care at [facility].  We wish you the best outcome and overall experience.  These instructions will help to minimize pain, limit the risk for an infection, and improve the likelihood of a successful recovery.    What to Expect:  Abdominal cramping   Vaginal bleeding for about 1-2 weeks   Constipation is common after surgery. Please keep up with Colace twice per day and Miralax as needed.     Over-The-Counter Medication  Non-prescription anti-inflammatory medications can also help to ease the pain.  You may take Aleve, Tylenol or Ibuprofen   Colace or Metamucil for Constipation  Drink a full glass of water with oral medication and take as directed.    Bathing/Showers  You may resume showers  No baths, swimming, hot tubs for at least 8 weeks.     Home Medication  Resume your home medications as instructed    Diet   Resume your normal diet    Activity  Refrain from vaginal intercourse, vaginal suppositories, tampon use or douches until after your follow up visit   No exercising for 1-2 weeks  You may climb stairs     Return to Work or School  You may return to work in a few days   Contact your gynecologist's office, if you need a medical release. ([phone])    Driving  Avoid driving if taking narcotics    Follow-up Appointment with Your Obstetrician  Call your Gynecologist's office today for a staple removal/incision check appointment and/or follow up appointment.   The number is [phone].  Verify your appointment date, day, time, and location.  At your office visit:  Your progress will be evaluated, pathology will be reviewed, and any additional concerns and instructions will be discussed.    Questions or Concerns  Call your gynecologist's office if you experience the following:  Severe pain not controlled by pain medication  Foul smelling vaginal discharge  Heavy bleeding  Shortness of breath  Fever  Crying and periods  of sadness that prevents you from caring for yourself   Burning sensation during urination or inability to urinate  Swelling, redness or abnormal warmth to your leg/calf  Please call [phone]. If your call is made after office hours, a physician will be available to help you.  There is always a provider covering our patients.    Thank you for coming to [facility] for your surgery.  The nurses, gynecologist, and the anesthesiologists try very hard to make sure you receive the best care possible.  Your trust in them as well as us is greatly appreciated.    Thanks so much,   The Providers of [facility] Obstetrics and Gynecology         HOME INSTRUCTIONS  [facility] HOME CARE INSTRUCTIONS: POST-OP ANESTHESIA  The medication that you received for sedation or general anesthesia can last up to 24 hours. Your judgment and reflexes may be altered, even if you feel like your normal self.      We Recommend:   Do not drive any motor vehicle or bicycle   Avoid mowing the lawn, playing sports, or working with power tools/applicances (power saws, electric knives or mixers)   That you have someone stay with you on your first night home   Do not drink alcohol or take sleeping pills or tranquilizers   Do not sign legal documents within 24 hours of your procedure   If you had a nerve block for your surgery, take extra care not to put any pressure on your arm or hand for 24 hours    It is normal:  For you to have a sore throat if you had a breathing tube during surgery (while you were asleep!). The sore throat should get better within 48 hours. You can gargle with warm salt water (1/2 tsp in 4 oz warm water) or use a throat lozenge for comfort  To feel muscle aches or soreness especially in the abdomen, chest or neck. The achy feeling should go away in the next 24 hours  To feel weak, sleepy or \"wiped out\". Your should start feeling better in the next 24 hours.   To experience mild discomforts such as sore lip or tongue,  headache, cramps, gas pains or a bloated feeling in your abdomen.   To experience mild back pain or soreness for a day or two if you had spinal or epidural anesthesia.   If you had laparoscopic surgery, to feel shoulder pain or discomfort on the day of surgery.   For some patients to have nausea after surgery/anesthesia    If you feel nausea or experience vomiting:   Try to move around less.   Eat less than usual or drink only liquids until the next morning   Nausea should resolve in about 24 hours    If you have a problem when you are at home:    Call your surgeons office   Discharge Instructions: After Your Surgery  You’ve just had surgery. During surgery, you were given medicine called anesthesia to keep you relaxed and free of pain. After surgery, you may have some pain or nausea. This is common. Here are some tips for feeling better and getting well after surgery.   Going home  Your healthcare provider will show you how to take care of yourself when you go home. They'll also answer your questions. Have an adult family member or friend drive you home. For the first 24 hours after your surgery:   Don't drive or use heavy equipment.  Don't make important decisions or sign legal papers.  Take medicines as directed.  Don't drink alcohol.  Have someone stay with you, if needed. They can watch for problems and help keep you safe.  Be sure to go to all follow-up visits with your healthcare provider. And rest after your surgery for as long as your provider tells you to.   Coping with pain  If you have pain after surgery, pain medicine will help you feel better. Take it as directed, before pain becomes severe. Also, ask your healthcare provider or pharmacist about other ways to control pain. This might be with heat, ice, or relaxation. And follow any other instructions your surgeon or nurse gives you.      Stay on schedule with your medicine.     Tips for taking pain medicine  To get the best relief possible, remember  these points:   Pain medicines can upset your stomach. Taking them with a little food may help.  Most pain relievers taken by mouth need at least 20 to 30 minutes to start to work.  Don't wait till your pain becomes severe before you take your medicine. Try to time your medicine so that you can take it before starting an activity. This might be before you get dressed, go for a walk, or sit down for dinner.  Constipation is a common side effect of some pain medicines. Call your healthcare provider before taking any medicines such as laxatives or stool softeners to help ease constipation. Also ask if you should skip any foods. Drinking lots of fluids and eating foods such as fruits and vegetables that are high in fiber can also help. Remember, don't take laxatives unless your surgeon has prescribed them.  Drinking alcohol and taking pain medicine can cause dizziness and slow your breathing. It can even be deadly. Don't drink alcohol while taking pain medicine.  Pain medicine can make you react more slowly to things. Don't drive or run machinery while taking pain medicine.  Your healthcare provider may tell you to take acetaminophen to help ease your pain. Ask them how much you're supposed to take each day. Acetaminophen or other pain relievers may interact with your prescription medicines or other over-the-counter (OTC) medicines. Some prescription medicines have acetaminophen and other ingredients in them. Using both prescription and OTC acetaminophen for pain can cause you to accidentally overdose. Read the labels on your OTC medicines with care. This will help you to clearly know the list of ingredients, how much to take, and any warnings. It may also help you not take too much acetaminophen. If you have questions or don't understand the information, ask your pharmacist or healthcare provider to explain it to you before you take the OTC medicine.   Managing nausea  Some people have an upset stomach (nausea) after  surgery. This is often because of anesthesia, pain, or pain medicine, less movement of food in the stomach, or the stress of surgery. These tips will help you handle nausea and eat healthy foods as you get better. If you were on a special food plan before surgery, ask your healthcare provider if you should follow it while you get better. Check with your provider on how your eating should progress. It may depend on the surgery you had. These general tips may help:   Don't push yourself to eat. Your body will tell you when to eat and how much.  Start off with clear liquids and soup. They're easier to digest.  Next try semi-solid foods as you feel ready. These include mashed potatoes, applesauce, and gelatin.  Slowly move to solid foods. Don’t eat fatty, rich, or spicy foods at first.  Don't force yourself to have 3 large meals a day. Instead eat smaller amounts more often.  Take pain medicines with a small amount of solid food, such as crackers or toast. This helps prevent nausea.  When to call your healthcare provider  Call your healthcare provider right away if you have any of these:   You still have too much pain, or the pain gets worse, after taking the medicine. The medicine may not be strong enough. Or there may be a complication from the surgery.  You feel too sleepy, dizzy, or groggy. The medicine may be too strong.  Side effects such as nausea or vomiting. Your healthcare provider may advise taking other medicines to .  Skin changes such as rash, itching, or hives. This may mean you have an allergic reaction. Your provider may advise taking other medicines.  The incision looks different (for instance, part of it opens up).  Bleeding or fluid leaking from the incision site, and weren't told to expect that.  Fever of 100.4°F (38°C) or higher, or as directed by your provider.  Call 911  Call 911 right away if you have:   Trouble breathing  Facial swelling    If you have obstructive sleep apnea   You were given  anesthesia medicine during surgery to keep you comfortable and free of pain. After surgery, you may have more apnea spells because of this medicine and other medicines you were given. The spells may last longer than normal.    At home:  Keep using the continuous positive airway pressure (CPAP) device when you sleep. Unless your healthcare provider tells you not to, use it when you sleep, day or night. CPAP is a common device used to treat obstructive sleep apnea.  Talk with your provider before taking any pain medicine, muscle relaxants, or sedatives. Your provider will tell you about the possible dangers of taking these medicines.  Contact your provider if your sleeping changes a lot even when taking medicines as directed.  [employer] last reviewed this educational content on 10/1/2021  © 8477-8098 [employer]. All rights reserved. This information is not intended as a substitute for professional medical care. Always follow your healthcare professional's instructions.

## 2025-04-10 NOTE — ED INITIAL ASSESSMENT (HPI)
Pt to ED w/ c/o vaginal spotting since Saturday, pt states she has only had to wear a liner, and today noticed a small amount of clots, also has been having increased lower back pain. Pt states she is approximately 6 weeks pregnant.

## 2025-04-10 NOTE — TELEPHONE ENCOUNTER
Incoming call from patient requesting to speak with a RN for current symptoms .    Please assist .

## 2025-04-21 ENCOUNTER — OFFICE VISIT (OUTPATIENT)
Dept: OBGYN CLINIC | Facility: CLINIC | Age: 30
End: 2025-04-21
Payer: COMMERCIAL

## 2025-04-21 VITALS
SYSTOLIC BLOOD PRESSURE: 118 MMHG | HEIGHT: 67 IN | WEIGHT: 293 LBS | DIASTOLIC BLOOD PRESSURE: 68 MMHG | BODY MASS INDEX: 45.99 KG/M2

## 2025-04-21 DIAGNOSIS — Z09 POSTOPERATIVE EXAMINATION: Primary | ICD-10-CM

## 2025-04-21 RX ORDER — TRANEXAMIC ACID 650 MG/1
1300 TABLET ORAL 3 TIMES DAILY
Qty: 90 TABLET | Refills: 3 | Status: SHIPPED | OUTPATIENT
Start: 2025-04-21 | End: 2025-05-06

## 2025-04-21 NOTE — PROGRESS NOTES
[facility]  Obstetrics and Gynecology   Post op incision check progress note    Subjective:     Dominique Low is a 30 year old  female who is s/p dilation and curettage with suction for 1st trimester missed ab on 4/10/25. Her surgery was uncomplicated. She noted her pain is well controlled. She tolerates PO intake without nausea and vomiting. Bleeding stopped.     Desires to use TXA for heavy menses. Prescription sent. Informed not to take if pregnant.     Review of Systems:  General: no complaints  Eyes: no complaints  Respiratory: no complaints  Cardiovascular: no complaints  GI: no complaints  : See HPI  Hematological/lymphatic: no complaints  Breast: no complaints  Psychiatric: no complaints  Endocrine:no complaints  Neurological: no complaints  Immunological: no complaints  Musculoskeletal:no complaints      Objective:     Vitals:    25 0824   BP: 118/68   Weight: (!) 304 lb 3.2 oz (138 kg)   Height: 67\"         Body mass index is 47.64 kg/m².    GENERAL: well developed, well nourished, in no apparent distress, alert and orientated X 3  PSYCH: mood and affect stable   SKIN: no rashes, no lesions  LUNGS: respiration unlabored  CARDIOVASCULAR: no peripheral edema or varicosities, skin warm and dry  Pelvic exam: deferred   Ext: non-tender, no edema    Pathology review:  4/10/25:   \"  Final Diagnosis:      Products of conception:   Products of conception, including immature chorionic villi.   \"     Assessment:     Dominique Low is a 30 year old  female who presents for wound check     Problem List[1]      Plan:     Post op wound exam   - s/p dilation and curettage on 4/10/25  - doing well,  no complaints   - no abnormal findings.   - Discontinue postoperative restrictions including nothing per vagina and no heavy lifting.  - Resume daily prenatal vitamin.   - Recommend to wait for 1 menses until trying again. Condoms in the meantime.   - Menses heavy.  Desires TXA. Prescription sent for 1 year. Informed not to take if pregnant.      All of the findings and plan were discussed with the patient.  She notes understanding and agrees with the plan of care.  All questions were answered to the best of my ability at this time.    RTC for annual when due  or sooner if needed     Dr. Tapan Lawler MD    [facility]     This note was created by [employer] voice recognition. Errors in content may be related to improper recognition by the system; efforts to review and correct have been done but errors may still exist. Please be advised the primary purpose of this note is for me to communicate medical care. Standard sentence structure is not always used. Medical terminology and medical abbreviations may be used. There may be grammatical, typographical, and automated fill ins that may have errors missed in proofreading.            [1]   Patient Active Problem List  Diagnosis    Morbid obesity with BMI of 45.0-49.9, adult (HCC)    Mild anemia    Menorrhagia with regular cycle    Iron deficiency    Pre-diabetes    Vitamin D deficiency    Encounter for therapeutic drug monitoring    Miscarriage (HCC)    Status post surgery    Missed  (HCC)

## 2025-04-23 DIAGNOSIS — Z87.42 HISTORY OF OVARIAN CYST: Primary | ICD-10-CM

## 2025-04-25 ENCOUNTER — TELEPHONE (OUTPATIENT)
Dept: OBGYN UNIT | Facility: HOSPITAL | Age: 30
End: 2025-04-25

## 2025-04-25 NOTE — TELEPHONE ENCOUNTER
ED APC SUPERVISION NOTE:   I evaluated this patient in conjunction with Crystal Joshua PA-C  I have participated in the care of the patient and personally performed key elements of the history, exam, and medical decision making.      HPI:   Amber Rocha is a 47 year old female with a past medical history significant for depression and anxiety here for evaluation of hallucinations. The patient reports onset of constant visual hallucinations 1 week ago. She reports seeing people that are not there and seeing inanimate object moving. She denies auditory hallucinations, suicidal ideations, and homicidal ideations. The patient's parents stated that the patient's daughter told them about the patient's disorganized thoughts and stating things that are not real. The patient has been taking citalopram and bupropion for a long time with no recent change. She recently started taking oxybutynin 2 months ago. The patient reports taking benadryl 2 night prior to onset of hallucinations. She has trouble finding the right words for speech and she also has trouble with GERD with some vomit recently. She denies family history of schizophrenia or bipolar disorder. She drinks alcohol regularly and her last drink was 6 days ago. She denies any other drug use.    Independent Historian:   Parents as detailed above.     EXAM:   GENERAL: Patient well-nourished. Alert, attentive  HEAD: Atraumatic.  EYES: Anicteric. PERRL  NOSE: No bleeding  MOUTH: Moist mucosa  THROAT: Patent airway.   NECK: No rigidity  CV: RRR, no murmurs, rubs or gallops  PULM: CTAB with good aeration; no retractions, rales, rhonchi, or wheezing  ABD: Soft, nontender, nondistended, no guarding, no peritoneal signs   DERM: Port wine birthmark right flank.  Skin warm and dry  EXTREMITY: Moving all extremities without difficulty. No calf tenderness or peripheral edema  NEURO: Alert, answering questions appropriately. Speaks clearly. CN 2-12 grossly intact.  Strength 5/5  On call page  Pt tested positive for covid 9/2/22. Still has cough and congestion. Thinks wheezing. No hx asthma  No SOB. Did not get treated for covid. rec otc cough medicine, rest, fluids .    rec go to immediate care for eval to see if mediation needed or can call her doctors office [facility] to see if any acute appts available bilateral LE/UE. Sensation fully intact to light touch symmetrically bilateral LE/UE. Normal finger-to-nose and heel to shin. Normal gait assessment without ataxia.  Patient has roving eye movements with slight evidence of nystagmus, but not persistent in the horizontal direction.  Do not see vertical nystagmus.  No clonus.  Patient will have twitching movements in all of her extremities while sleeping, that stop when she awakens.     Independent Interpretation (X-rays, CTs, rhythm strip):  CT head no bleed.    Consultations/Discussion of Management or Tests:  None       MEDICAL DECISION MAKING/ASSESSMENT AND PLAN:   47-year-old female, brought in by parents for abnormal behavior.  Patient has been having visual hallucinations.  She has also been having twitching movements of her extremities.  She has abnormal eye movement.  Does have history of regular alcohol use.  Last drink was a couple days ago.  She does not appear to be withdrawing from alcohol.  No focal neurologic deficit.  Only notable finding on neurologic assessment is that she has these abnormal eye movements/twitching.  It is not a paulina nystagmus, but does rotate abnormally.  CT head and CTA head and neck negative for acute process.  Considered anticholinergic toxidrome as pupils are slightly large and does take oxybutynin daily, but that does not seem at a high enough dose to cause toxicity as she is not taking any extra.  Did take Benadryl a couple days ago but only 2 doses and that would seem odd to continue to be in her system today.  However anticholinergic toxidrome is definitely in the differential.  Considered serotonin syndrome but no clonus.  Not sweaty.  Not tachy.  Wernicke's encephalopathy also consideration, therefore given 500 mg IV thiamine.  I also considered NMDA toxicity so performed a lumbar puncture and results pending upon admission with hospitalist to follow-up.  Have also ordered a brain MRI.  Labs do show amphetamines in the  urine, but she is on bupropion which can cause a false positive for this and she adamantly denies any substance abuse.  Had psych assess patient as well, however this does not appear to be a primary psychiatric issue.  I am concerned for something metabolic versus other.  No fever.  No headache or neck pain.  I do not see indication for antibiotics.  Patient to be admitted by Dr. Mahoney for further workup.  Ammonia and CSF sample pending upon admission. Also added on acetaminophen level due to mild transaminitis although denies any other ingestions.     DIAGNOSIS:     ICD-10-CM    1. Visual hallucinations  R44.1       2. Transaminitis  R74.01       3. Hyponatremia  E87.1       4. Hypocalcemia  E83.51             Scribe Disclosure:  I, Mateo Lopez, am serving as a scribe at 7:53 PM on 4/24/2025 to document services personally performed by Semaj Arndt MD based on my observations and the provider's statements to me.   4/24/2025  [facility] EMERGENCY DEPT     Semaj Arndt MD  04/24/25 [id number]

## 2025-05-28 ENCOUNTER — PATIENT MESSAGE (OUTPATIENT)
Dept: OBGYN CLINIC | Facility: CLINIC | Age: 30
End: 2025-05-28

## 2025-06-05 NOTE — TELEPHONE ENCOUNTER
Tapan Lawelr MD to [facility] Ob Clinical Staff (Selected Message)        6/5/25  1:30 PM  Please have her do both a genetics counseling visit as well as [employer] carrier screening.     Thank you.     Dr. Lawler

## 2025-06-06 ENCOUNTER — HOSPITAL ENCOUNTER (OUTPATIENT)
Age: 30
Discharge: HOME | End: 2025-06-06
Payer: COMMERCIAL

## 2025-06-06 VITALS
WEIGHT: 280 LBS | SYSTOLIC BLOOD PRESSURE: 143 MMHG | OXYGEN SATURATION: 98 % | TEMPERATURE: 98 F | DIASTOLIC BLOOD PRESSURE: 65 MMHG | HEART RATE: 97 BPM | HEIGHT: 69 IN | RESPIRATION RATE: 18 BRPM | BODY MASS INDEX: 41.47 KG/M2

## 2025-06-06 DIAGNOSIS — N30.01 ACUTE CYSTITIS WITH HEMATURIA: Primary | ICD-10-CM

## 2025-06-06 LAB
B-HCG UR QL: NEGATIVE
BILIRUB UR QL STRIP: NEGATIVE
COLOR UR: YELLOW
GLUCOSE UR STRIP-MCNC: NEGATIVE MG/DL
KETONES UR STRIP-MCNC: NEGATIVE MG/DL
NITRITE UR QL STRIP: NEGATIVE
PH UR STRIP: 6.5 [PH]
PROT UR STRIP-MCNC: NEGATIVE MG/DL
SP GR UR STRIP: 1.02
UROBILINOGEN UR STRIP-ACNC: <2 MG/DL

## 2025-06-06 PROCEDURE — 99213 OFFICE O/P EST LOW 20 MIN: CPT

## 2025-06-06 PROCEDURE — 81002 URINALYSIS NONAUTO W/O SCOPE: CPT

## 2025-06-06 PROCEDURE — 81025 URINE PREGNANCY TEST: CPT

## 2025-06-06 RX ORDER — CEFADROXIL 500 MG/1
500 CAPSULE ORAL 2 TIMES DAILY
Qty: 14 CAPSULE | Refills: 0 | Status: SHIPPED | OUTPATIENT
Start: 2025-06-06 | End: 2025-06-13

## 2025-06-06 NOTE — ED PROVIDER NOTES
Patient Seen in: [facility]      History     Chief Complaint   Patient presents with    Urinary Urgency     Stated Complaint: Eval-G  Subjective:   Dominique is a 30 year-old female presenting to the immediate care complaining of dysuria, urinary frequency and urinary urgency for the past 5 days.  Patient states that today she noticed a small amount of blood when wiping the last 2 times she went to the bathroom so she came in for evaluation..  Denies any vaginal bleeding or discharge.  No concern for STDs.  Patient does have some mild lower abdominal pressure and mild low back pain.  Denies any abdominal pain.  No history of pyelonephritis or kidney stones.  She has not had any fever, nausea, vomiting or diarrhea.  She is eating and drinking well and is well-hydrated.  She denies any other concerns or complaints.            Objective:   Past Medical History:    Abnormal uterine bleeding    I went to ER for heavy blood which was not related to period    Allergic rhinitis    Anemia    Mild    Dysmenorrhea    always have really bad cramps.    Esophageal reflux    Gastritis    Morbid obesity with BMI of 45.0-49.9, adult (HCC)    Ovarian cyst    per pt h/x     Pre-diabetes            Past Surgical History:   Procedure Laterality Date    D & c  04/10/2025    D&C with suction for missed  at 8 weeks.    Tonsillectomy                Social History     Socioeconomic History    Marital status:    Occupational History    Occupation:     Tobacco Use    Smoking status: Never    Smokeless tobacco: Never    Tobacco comments:     N/A   Vaping Use    Vaping status: Never Used   Substance and Sexual Activity    Alcohol use: Not Currently     Comment: occasionally    Drug use: Never    Sexual activity: Yes     Partners: Male   Other Topics Concern    Caffeine Concern No    Exercise No    Seat Belt No    Special Diet No    Stress Concern No    Weight Concern Yes     Comment: I am currently over  weight, need to loose some weight    Blood Transfusions No     Social Drivers of Health     Food Insecurity: No Food Insecurity (3/25/2025)    NCSS - Food Insecurity     Worried About Running Out of Food in the Last Year: No     Ran Out of Food in the Last Year: No   Transportation Needs: No Transportation Needs (3/25/2025)    NCSS - Transportation     Lack of Transportation: No   Housing Stability: Not At Risk (3/25/2025)    NCSS - Housing/Utilities     Has Housing: Yes     Worried About Losing Housing: No     Unable to Get Utilities: No            Review of Systems    Positive for stated complaint: Urinary Urgency    Other systems are as noted in HPI.  Constitutional and vital signs reviewed.      All other systems reviewed and negative except as noted above.    Physical Exam     ED Triage Vitals [06/06/25 1507]   /65   Pulse 97   Resp 18   Temp 98 °F (36.7 °C)   Temp src Oral   SpO2 98 %   O2 Device      Current:/65   Pulse 97   Temp 98 °F (36.7 °C) (Oral)   Resp 18   Ht 175.3 cm (5' 9\")   Wt 127 kg   LMP 05/12/2025 (Exact Date)   SpO2 98%   BMI 41.35 kg/m²     Physical Exam  Vitals and nursing note reviewed.   Constitutional:       General: She is not in acute distress.     Appearance: Normal appearance. She is not ill-appearing, toxic-appearing or diaphoretic.   HENT:      Head: Normocephalic.   Cardiovascular:      Rate and Rhythm: Normal rate.   Pulmonary:      Effort: Pulmonary effort is normal. No respiratory distress.   Abdominal:      General: Abdomen is flat. Bowel sounds are normal. There is no distension.      Palpations: Abdomen is soft. There is no mass.      Tenderness: There is no abdominal tenderness. There is no right CVA tenderness, left CVA tenderness, guarding or rebound.      Hernia: No hernia is present.      Comments: Mild pressure over the bladder/suprapubic area   Musculoskeletal:         General: Normal range of motion.      Cervical back: Normal range of motion.    Skin:     General: Skin is warm and dry.      Capillary Refill: Capillary refill takes less than 2 seconds.   Neurological:      General: No focal deficit present.      Mental Status: She is alert and oriented to person, place, and time.   Psychiatric:         Mood and Affect: Mood normal.         Behavior: Behavior normal.         Thought Content: Thought content normal.         Judgment: Judgment normal.         ED Course     Radiology:    No orders to display     Labs Reviewed   [facility] POCT URINALYSIS DIPSTICK - Abnormal; Notable for the following components:       Result Value    Urine Clarity Slightly cloudy (*)     Blood, Urine Trace-Intact (*)     Leukocyte esterase urine Trace (*)     All other components within normal limits   POCT PREGNANCY URINE - Normal   URINE CULTURE, ROUTINE       MDM     Medical Decision Making  Differential diagnoses reflecting the complexity of care include but are not limited to UTI, pyelonephritis, kidney stone, overactive bladder.    Comorbidities that add complexity to management include: Recent miscarriage and D&C  History obtained by an independent source was from: Patient  Patient is well appearing, non-toxic and in no acute distress.  Vital signs are stable.     Patient's history and physical exam are consistent with a UTI.  No CVA tenderness, no fever, no abdominal pain.  Mild pressure over the bladder.  Urine pregnancy test was negative.  Patient recently had a miscarriage and a D&C about 6 weeks ago.  States that she has had 1 normal menstrual cycle since then and is due to get her next one in about a week.  Urine dip was cloudy and positive for blood and leukocytes.  Prescription was sent for cefadroxil to treat UTI.  Urine culture was sent and will follow results.  Recommended to the patient to take a probiotic to prevent yeast infections.  Recommended that the patient increase p.o. fluid intake, take Tylenol and Motrin for pain or fever.  I recommended that if the  patient develops any abdominal pain, flank pain, back pain, vomiting, high fever that does not resolve with medications or any other concerning complaints they should go to the emergency department.    ED precautions discussed.  Patient (guardian) advised to follow up with PCP in 2-3 days.  Patient (guardian) agrees with this plan of care.  Patient (guardian) verbalizes understanding of discharge instructions and plan of care.      Amount and/or Complexity of Data Reviewed  Labs: ordered. Decision-making details documented in ED Course.    Risk  OTC drugs.  Prescription drug management.        Disposition and Plan     Clinical Impression:  1. Acute cystitis with hematuria         Disposition:  Discharge  6/6/2025  3:20 pm    Follow-up:  Nic Cordova MD  [address] [place], [place] [id number] [phone]                Medications Prescribed:  Discharge Medication List as of 6/6/2025  3:20 PM        START taking these medications    Details   cefadroxil 500 MG Oral Cap Take 1 capsule (500 mg total) by mouth 2 (two) times daily for 7 days., Normal, Disp-14 capsule, R-0

## 2025-07-20 ENCOUNTER — PATIENT MESSAGE (OUTPATIENT)
Dept: OBGYN CLINIC | Facility: CLINIC | Age: 30
End: 2025-07-20

## 2025-07-22 ENCOUNTER — APPOINTMENT (OUTPATIENT)
Facility: LOCATION | Age: 30
End: 2025-07-22

## 2025-07-25 ENCOUNTER — APPOINTMENT (OUTPATIENT)
Facility: LOCATION | Age: 30
End: 2025-07-25
Attending: OBSTETRICS & GYNECOLOGY

## 2025-07-29 ENCOUNTER — OFFICE VISIT (OUTPATIENT)
Dept: OBGYN CLINIC | Facility: CLINIC | Age: 30
End: 2025-07-29
Payer: COMMERCIAL

## 2025-07-29 VITALS
HEIGHT: 67 IN | WEIGHT: 293 LBS | DIASTOLIC BLOOD PRESSURE: 70 MMHG | SYSTOLIC BLOOD PRESSURE: 126 MMHG | BODY MASS INDEX: 45.99 KG/M2

## 2025-07-29 DIAGNOSIS — Z12.4 CERVICAL CANCER SCREENING: ICD-10-CM

## 2025-07-29 DIAGNOSIS — N94.89 UTERINE CRAMPING: ICD-10-CM

## 2025-07-29 DIAGNOSIS — N93.9 ABNORMAL UTERINE BLEEDING (AUB): Primary | ICD-10-CM

## 2025-07-29 PROCEDURE — 88175 CYTOPATH C/V AUTO FLUID REDO: CPT | Performed by: OBSTETRICS & GYNECOLOGY

## 2025-07-29 PROCEDURE — 87624 HPV HI-RISK TYP POOLED RSLT: CPT | Performed by: OBSTETRICS & GYNECOLOGY

## 2025-07-29 PROCEDURE — 99214 OFFICE O/P EST MOD 30 MIN: CPT | Performed by: OBSTETRICS & GYNECOLOGY

## 2025-07-29 RX ORDER — NORETHINDRONE ACETATE AND ETHINYL ESTRADIOL 1MG-20(21)
1 KIT ORAL DAILY
Qty: 84 TABLET | Refills: 3 | Status: SHIPPED | OUTPATIENT
Start: 2025-07-29 | End: 2026-07-29

## 2025-07-30 LAB — HPV E6+E7 MRNA CVX QL NAA+PROBE: NEGATIVE

## 2025-07-31 ENCOUNTER — PATIENT MESSAGE (OUTPATIENT)
Dept: OBGYN CLINIC | Facility: CLINIC | Age: 30
End: 2025-07-31

## 2025-07-31 DIAGNOSIS — N94.89 UTERINE CRAMPING: Primary | ICD-10-CM

## 2025-07-31 DIAGNOSIS — N93.9 ABNORMAL UTERINE BLEEDING (AUB): ICD-10-CM

## 2025-08-01 ENCOUNTER — HOSPITAL ENCOUNTER (OUTPATIENT)
Dept: ULTRASOUND IMAGING | Facility: HOSPITAL | Age: 30
Discharge: HOME | End: 2025-08-01
Attending: OBSTETRICS & GYNECOLOGY

## 2025-08-01 DIAGNOSIS — N94.89 UTERINE CRAMPING: ICD-10-CM

## 2025-08-01 DIAGNOSIS — N93.9 ABNORMAL UTERINE BLEEDING (AUB): ICD-10-CM

## 2025-08-01 PROCEDURE — 76856 US EXAM PELVIC COMPLETE: CPT | Performed by: OBSTETRICS & GYNECOLOGY

## 2025-08-01 PROCEDURE — 76830 TRANSVAGINAL US NON-OB: CPT | Performed by: OBSTETRICS & GYNECOLOGY

## 2025-08-06 ENCOUNTER — OFFICE VISIT (OUTPATIENT)
Dept: FAMILY MEDICINE CLINIC | Facility: CLINIC | Age: 30
End: 2025-08-06

## 2025-08-06 VITALS
DIASTOLIC BLOOD PRESSURE: 69 MMHG | HEIGHT: 69 IN | BODY MASS INDEX: 43.4 KG/M2 | HEART RATE: 90 BPM | SYSTOLIC BLOOD PRESSURE: 106 MMHG | WEIGHT: 293 LBS | TEMPERATURE: 98 F

## 2025-08-06 DIAGNOSIS — E55.9 VITAMIN D DEFICIENCY: ICD-10-CM

## 2025-08-06 DIAGNOSIS — R73.03 PRE-DIABETES: ICD-10-CM

## 2025-08-06 DIAGNOSIS — E61.1 IRON DEFICIENCY: ICD-10-CM

## 2025-08-06 DIAGNOSIS — N92.0 MENORRHAGIA WITH REGULAR CYCLE: ICD-10-CM

## 2025-08-06 DIAGNOSIS — E66.01 MORBID OBESITY WITH BMI OF 45.0-49.9, ADULT (HCC): ICD-10-CM

## 2025-08-06 DIAGNOSIS — Z00.00 WELL ADULT EXAM: Primary | ICD-10-CM

## 2025-08-06 PROCEDURE — 99395 PREV VISIT EST AGE 18-39: CPT | Performed by: FAMILY MEDICINE

## 2025-08-06 RX ORDER — FERROUS SULFATE 325(65) MG
325 TABLET, DELAYED RELEASE (ENTERIC COATED) ORAL
COMMUNITY

## 2025-08-08 ENCOUNTER — LAB ENCOUNTER (OUTPATIENT)
Dept: LAB | Facility: HOSPITAL | Age: 30
End: 2025-08-08
Attending: FAMILY MEDICINE

## 2025-08-08 DIAGNOSIS — Z00.00 WELL ADULT EXAM: ICD-10-CM

## 2025-08-08 DIAGNOSIS — E55.9 VITAMIN D DEFICIENCY: ICD-10-CM

## 2025-08-08 DIAGNOSIS — E61.1 IRON DEFICIENCY: ICD-10-CM

## 2025-08-08 LAB
ALBUMIN SERPL-MCNC: 4.8 G/DL (ref 3.2–4.8)
ALBUMIN/GLOB SERPL: 1.8 (ref 1–2)
ALP LIVER SERPL-CCNC: 78 U/L (ref 37–98)
ALT SERPL-CCNC: 22 U/L (ref 10–49)
ANION GAP SERPL CALC-SCNC: 7 MMOL/L (ref 0–18)
AST SERPL-CCNC: 16 U/L (ref ?–34)
BASOPHILS # BLD AUTO: 0.03 X10(3) UL (ref 0–0.2)
BASOPHILS NFR BLD AUTO: 0.4 %
BILIRUB SERPL-MCNC: 0.4 MG/DL (ref 0.3–1.2)
BUN BLD-MCNC: 14 MG/DL (ref 9–23)
BUN/CREAT SERPL: 20.6 (ref 10–20)
CALCIUM BLD-MCNC: 9.9 MG/DL (ref 8.7–10.4)
CHLORIDE SERPL-SCNC: 103 MMOL/L (ref 98–112)
CHOLEST SERPL-MCNC: 143 MG/DL (ref ?–200)
CO2 SERPL-SCNC: 29 MMOL/L (ref 21–32)
CREAT BLD-MCNC: 0.68 MG/DL (ref 0.55–1.02)
DEPRECATED RDW RBC AUTO: 40.2 FL (ref 35.1–46.3)
EGFRCR SERPLBLD CKD-EPI 2021: 119 ML/MIN/1.73M2 (ref 60–?)
EOSINOPHIL # BLD AUTO: 0.11 X10(3) UL (ref 0–0.7)
EOSINOPHIL NFR BLD AUTO: 1.5 %
ERYTHROCYTE [DISTWIDTH] IN BLOOD BY AUTOMATED COUNT: 13 % (ref 11–15)
EST. AVERAGE GLUCOSE BLD GHB EST-MCNC: 120 MG/DL (ref 68–126)
FASTING PATIENT LIPID ANSWER: YES
FASTING STATUS PATIENT QL REPORTED: YES
GLOBULIN PLAS-MCNC: 2.7 G/DL (ref 2–3.5)
GLUCOSE BLD-MCNC: 99 MG/DL (ref 70–99)
HBA1C MFR BLD: 5.8 % (ref ?–5.7)
HCT VFR BLD AUTO: 37 % (ref 35–48)
HDLC SERPL-MCNC: 47 MG/DL (ref 40–59)
HGB BLD-MCNC: 12.2 G/DL (ref 12–16)
IMM GRANULOCYTES # BLD AUTO: 0.02 X10(3) UL (ref 0–1)
IMM GRANULOCYTES NFR BLD: 0.3 %
IRON SATN MFR SERPL: 26 % (ref 15–50)
IRON SERPL-MCNC: 80 UG/DL (ref 50–170)
LDLC SERPL CALC-MCNC: 83 MG/DL (ref ?–100)
LYMPHOCYTES # BLD AUTO: 2.47 X10(3) UL (ref 1–4)
LYMPHOCYTES NFR BLD AUTO: 33.6 %
MCH RBC QN AUTO: 27.9 PG (ref 26–34)
MCHC RBC AUTO-ENTMCNC: 33 G/DL (ref 31–37)
MCV RBC AUTO: 84.7 FL (ref 80–100)
MONOCYTES # BLD AUTO: 0.48 X10(3) UL (ref 0.1–1)
MONOCYTES NFR BLD AUTO: 6.5 %
NEUTROPHILS # BLD AUTO: 4.24 X10 (3) UL (ref 1.5–7.7)
NEUTROPHILS # BLD AUTO: 4.24 X10(3) UL (ref 1.5–7.7)
NEUTROPHILS NFR BLD AUTO: 57.7 %
NONHDLC SERPL-MCNC: 96 MG/DL (ref ?–130)
OSMOLALITY SERPL CALC.SUM OF ELEC: 289 MOSM/KG (ref 275–295)
PLATELET # BLD AUTO: 267 10(3)UL (ref 150–450)
POTASSIUM SERPL-SCNC: 4.5 MMOL/L (ref 3.5–5.1)
PROT SERPL-MCNC: 7.5 G/DL (ref 5.7–8.2)
RBC # BLD AUTO: 4.37 X10(6)UL (ref 3.8–5.3)
SODIUM SERPL-SCNC: 139 MMOL/L (ref 136–145)
TOTAL IRON BINDING CAPACITY: 313 UG/DL (ref 250–425)
TRANSFERRIN SERPL-MCNC: 242 MG/DL (ref 250–380)
TRIGL SERPL-MCNC: 66 MG/DL (ref 30–149)
TSI SER-ACNC: 3.16 UIU/ML (ref 0.55–4.78)
VIT D+METAB SERPL-MCNC: 26.2 NG/ML (ref 30–100)
VLDLC SERPL CALC-MCNC: 10 MG/DL (ref 0–30)
WBC # BLD AUTO: 7.4 X10(3) UL (ref 4–11)

## 2025-08-08 PROCEDURE — 84443 ASSAY THYROID STIM HORMONE: CPT

## 2025-08-08 PROCEDURE — 82306 VITAMIN D 25 HYDROXY: CPT

## 2025-08-08 PROCEDURE — 80053 COMPREHEN METABOLIC PANEL: CPT

## 2025-08-08 PROCEDURE — 36415 COLL VENOUS BLD VENIPUNCTURE: CPT

## 2025-08-08 PROCEDURE — 85025 COMPLETE CBC W/AUTO DIFF WBC: CPT

## 2025-08-08 PROCEDURE — 80061 LIPID PANEL: CPT

## 2025-08-08 PROCEDURE — 83036 HEMOGLOBIN GLYCOSYLATED A1C: CPT

## 2025-08-08 PROCEDURE — 84466 ASSAY OF TRANSFERRIN: CPT

## 2025-08-08 PROCEDURE — 83540 ASSAY OF IRON: CPT

## 2025-08-15 ENCOUNTER — OFFICE VISIT (OUTPATIENT)
Facility: LOCATION | Age: 30
End: 2025-08-15
Attending: GENETIC COUNSELOR, MS

## 2025-08-15 ENCOUNTER — NURSE ONLY (OUTPATIENT)
Facility: LOCATION | Age: 30
End: 2025-08-15
Attending: GENETIC COUNSELOR, MS

## 2025-08-15 DIAGNOSIS — Z31.430 ENCOUNTER OF FEMALE FOR TESTING FOR GENETIC DISEASE CARRIER STATUS FOR PROCREATIVE MANAGEMENT: Primary | ICD-10-CM

## 2025-08-15 DIAGNOSIS — Z84.89 FAMILY HISTORY OF GENETIC DISORDER: ICD-10-CM

## 2025-08-15 DIAGNOSIS — O03.9 MISCARRIAGE (HCC): ICD-10-CM

## 2025-08-27 ENCOUNTER — GENETICS ENCOUNTER (OUTPATIENT)
Facility: LOCATION | Age: 30
End: 2025-08-27

## 2025-08-27 DIAGNOSIS — Z13.71 GENETIC DISEASE CARRIER STATUS TESTING, FEMALE: Primary | ICD-10-CM

## (undated) DEVICE — STRAP OR POS W3.5XL19IN FOAM STRRP W/ SLIP

## (undated) DEVICE — JELLY,LUBE,STERILE,FLIP TOP,TUBE,2-OZ: Brand: MEDLINE

## (undated) DEVICE — SYSTEM COLL W/ TISS TRAP INCLUDE COLL CANSTR

## (undated) DEVICE — CURETTE SURG 9MM CLR PLAS RIG CRV DISP

## (undated) DEVICE — Device: Brand: JELCO

## (undated) DEVICE — SUCTION CANISTER, 3000CC,SAFELINER: Brand: DEROYAL

## (undated) DEVICE — SYSTEM COLL CANSTR LID SEAL CAP W/O TISS TRAP

## (undated) DEVICE — PACK CUSTOM D AND C

## (undated) DEVICE — SOLUTION IRRIG 1000ML 0.9% NACL USP BTL

## (undated) DEVICE — HANDLE SUR BLU PLAS LT FLX SLIP ON ST DISP

## (undated) DEVICE — CURETTE VAC ASPIR 8MM RIG CRV DISP

## (undated) DEVICE — GLOVE SUR 7.5 SENSICARE PI MIC PIP CRM PWD F

## (undated) DEVICE — SET COLL TBNG 3/8INX6FT W/ INTEGR SWVL

## (undated) NOTE — ED AVS SNAPSHOT
Mariaa Torres   MRN: [id number]    Department:  [facility] Emergency Department   Date of Visit:  12/11/2018           Disclosure     Insurance plans vary and the physician(s) referred by the ER may not be covered by your plan.  Please CARE PHYSICIAN AT ONCE OR RETURN IMMEDIATELY TO THE EMERGENCY DEPARTMENT. If you have been prescribed any medication(s), please fill your prescription right away and begin taking the medication(s) as directed.   If you believe that any of the medications

## (undated) NOTE — LETTER
Date & Time: 7/14/2019, 7:18 PM  Patient: Monik Joshi  Encounter Provider(s):    On File, E D Attending  Eduar Guillen MD       To Whom It May Concern:    Monik Joshi was seen and treated in our department on 7/14/2019.

## (undated) NOTE — LETTER
Date & Time: 12/28/2021, 4:07 PM  Patient: Halima Horton  Encounter Provider(s):    SHAUN Limon       To Whom It May Concern:    Halima Horton was seen and treated in our department on 12/28/2021.  She should not return to wor

## (undated) NOTE — LETTER
Date & Time: 7/5/2023, 12:03 PM  Patient: Shell Herrmann  Encounter Provider(s):    SHAUN Richardson       To Whom It May Concern:    Shell Herrmann was seen and treated in our department on 7/5/2023. Please excuse her from work today.   If you have any questions or concerns, please do not hesitate to call.        _____________________________  Physician/APC Signature

## (undated) NOTE — ED AVS SNAPSHOT
Dahiana Medina   MRN: [id number]    Department:  [facility] Emergency Department   Date of Visit:  7/12/2019           Disclosure     Insurance plans vary and the physician(s) referred by the ER may not be covered by your plan.  Please c CARE PHYSICIAN AT ONCE OR RETURN IMMEDIATELY TO THE EMERGENCY DEPARTMENT. If you have been prescribed any medication(s), please fill your prescription right away and begin taking the medication(s) as directed.   If you believe that any of the medications

## (undated) NOTE — LETTER
[facility]  [address]  [phone]     Patient: Earma Londonderry   YOB: 1995   Date of Visit: 10/24/2020     Dear Employer,        2020    At [facility], we are taking speci Persons infected with SARS-CoV-2 who never develop COVID-19 symptoms may discontinue isolation and other precautions 10 days after the date of their first positive RT-PCR test for SARS-CoV-2 RNA.     Persons who are asymptomatic but have been exposed, CDC r

## (undated) NOTE — ED AVS SNAPSHOT
Kamala Cain   MRN: [id number]    Department:  [facility] Emergency Department   Date of Visit:  2/12/2020           Disclosure     Insurance plans vary and the physician(s) referred by the ER may not be covered by your plan.  Please c CARE PHYSICIAN AT ONCE OR RETURN IMMEDIATELY TO THE EMERGENCY DEPARTMENT. If you have been prescribed any medication(s), please fill your prescription right away and begin taking the medication(s) as directed.   If you believe that any of the medications

## (undated) NOTE — LETTER
October 28, 2020    Patient: Melonie Salas   YOB: 1995     Dear Employer,  At [facility], we are taking special precautions and doing everything we can to prevent the spread of COVID-19 (coronavirus disease 2019).  Du ? To maximize the safety of employees, employers and clients, we encourage employers to allow self-identified high-risk patients to work from home if it is at all feasible for them to do so.  COVID-19 is especially risky for high-risk patients (including, b

## (undated) NOTE — ED AVS SNAPSHOT
Angel Starr   MRN: [id number]    Department:  [facility] Emergency Department   Date of Visit:  7/14/2019           Disclosure     Insurance plans vary and the physician(s) referred by the ER may not be covered by your plan.  Please c CARE PHYSICIAN AT ONCE OR RETURN IMMEDIATELY TO THE EMERGENCY DEPARTMENT. If you have been prescribed any medication(s), please fill your prescription right away and begin taking the medication(s) as directed.   If you believe that any of the medications

## (undated) NOTE — LETTER
Date & Time: 12/4/2024, 11:40 AM  Patient: Dominique Low  Encounter Provider(s):    Maliha Reza APRN       To Whom It May Concern:    Dominique Low was seen and treated in our department on 12/4/2024. She should not return to work until 12/6/2024 .    If you have any questions or concerns, please do not hesitate to call.        _____________________________  Physician/APC Signature